# Patient Record
Sex: MALE
[De-identification: names, ages, dates, MRNs, and addresses within clinical notes are randomized per-mention and may not be internally consistent; named-entity substitution may affect disease eponyms.]

---

## 2024-06-10 ENCOUNTER — NON-APPOINTMENT (OUTPATIENT)
Age: 29
End: 2024-06-10

## 2024-06-10 ENCOUNTER — APPOINTMENT (OUTPATIENT)
Dept: OTOLARYNGOLOGY | Facility: CLINIC | Age: 29
End: 2024-06-10
Payer: COMMERCIAL

## 2024-06-10 VITALS
DIASTOLIC BLOOD PRESSURE: 85 MMHG | WEIGHT: 275 LBS | BODY MASS INDEX: 39.37 KG/M2 | TEMPERATURE: 98.2 F | HEIGHT: 70 IN | OXYGEN SATURATION: 99 % | SYSTOLIC BLOOD PRESSURE: 150 MMHG | HEART RATE: 92 BPM

## 2024-06-10 DIAGNOSIS — Z78.9 OTHER SPECIFIED HEALTH STATUS: ICD-10-CM

## 2024-06-10 DIAGNOSIS — H61.23 IMPACTED CERUMEN, BILATERAL: ICD-10-CM

## 2024-06-10 DIAGNOSIS — Z87.891 PERSONAL HISTORY OF NICOTINE DEPENDENCE: ICD-10-CM

## 2024-06-10 DIAGNOSIS — Z82.49 FAMILY HISTORY OF ISCHEMIC HEART DISEASE AND OTHER DISEASES OF THE CIRCULATORY SYSTEM: ICD-10-CM

## 2024-06-10 PROBLEM — Z00.00 ENCOUNTER FOR PREVENTIVE HEALTH EXAMINATION: Status: ACTIVE | Noted: 2024-06-10

## 2024-06-10 PROCEDURE — 31231 NASAL ENDOSCOPY DX: CPT

## 2024-06-10 PROCEDURE — 99204 OFFICE O/P NEW MOD 45 MIN: CPT | Mod: 25

## 2024-06-10 RX ORDER — FAMOTIDINE 20 MG/1
20 TABLET, FILM COATED ORAL
Qty: 180 | Refills: 1 | Status: ACTIVE | COMMUNITY
Start: 2024-06-10 | End: 1900-01-01

## 2024-06-10 RX ORDER — FLUTICASONE PROPIONATE 50 UG/1
50 SPRAY, METERED NASAL
Qty: 3 | Refills: 1 | Status: ACTIVE | COMMUNITY
Start: 2024-06-10 | End: 1900-01-01

## 2024-06-10 RX ORDER — AMOXICILLIN AND CLAVULANATE POTASSIUM 875; 125 MG/1; MG/1
875-125 TABLET, COATED ORAL
Qty: 14 | Refills: 0 | Status: ACTIVE | COMMUNITY
Start: 2024-06-10 | End: 1900-01-01

## 2024-06-10 NOTE — PHYSICAL EXAM
[de-identified] : thick [Binocular Microscopic Exam] : Binocular microscopic exam was performed [FreeTextEntry8] : Obstructing cerumen was removed with a hook [FreeTextEntry9] : Obstructing cerumen was removed with a hook [Nasal Endoscopy Performed] : nasal endoscopy was performed, see procedure section for findings [de-identified] :  Large inferior turbinates; positive Afrin test [Laryngoscopy Performed] : laryngoscopy was performed, see procedure section for findings [de-identified] : mFTP2a [de-identified] : 3+ [Normal] : no masses and lesions seen, face is symmetric

## 2024-06-10 NOTE — HISTORY OF PRESENT ILLNESS
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 Subsequent hearing loss generally passes with extended decongestant use. This occurred most recently 2 wks ago. At baseline he doesn't notice hearing loss or tinnitus. No hx tubes or an audio Stuffy nose for which he uses nasal decongestant spray prn. No hx obvious allergies but he has perpetual discolored rhinorrhea and some midfacial headaches. He's never tried a nasal steroid.  Periodic reflux that he doesn't treat Snoring, witnessed apneas, lots of daytime somnolence and worsening HTN. Gaining weight.

## 2024-06-10 NOTE — ASSESSMENT
[FreeTextEntry1] : We discussed a BMT vs balloon ET dila'n; he'd like to be able to SCUBA so we will seek approval of the latter.   First, however, start a nasal steroid, sleep study, abx, CT sinus and RTC

## 2024-06-10 NOTE — PROCEDURE
[FreeTextEntry6] : Indication: requirement for exam not possible via anterior rhinoscopy; chronic nasal obstruction After verbal consent and the administration of an aerosolized oxymetazoline/lidocaine mix, examination was performed with a flexible endoscope attached to a video monitoring system. Findings: Septum: undulating Mucosa: normal Polyposis: not present Inferior turbinates: very large, pos Afrin test Middle and superior turbinates: normal Inferior meatus: unremarkable Middle meatus: unremarkable Superior meatus: unremarkable Speno-ethmoidal recess: unremarkable Nasopharynx: unremarkable Secretions: purulent posteriorly on both sides Other findings: none [de-identified] : Indication: requirement for exam not possible via anterior examination; ARA After verbal consent and the administration of an aerosolized oxymetazoline/lidocaine mix, examination was performed with a flexible endoscope placed through the nose which was attached to a video monitoring system. Findings: Nasopharynx: unremarkable Soft palate, lateral and posterior pharyngeal walls: unremarkable Base of tongue & lingual tonsil: minimal retrodisplacement Vallecula: unremarkable Epiglottis: omega Piriform sinuses and pharyngoesophageal junction: unremarkable Arytenoids and AE folds: moderate interarytenoid edema Ventricle and false vocal folds: unremarkable True vocal folds: Smooth free edge; surface without ectasias or edema; normal movement bilaterally with good apposition in phonation Visible subglottis: unremarkable Narrow-band imaging: not used Other findings: ELM

## 2024-06-18 ENCOUNTER — APPOINTMENT (OUTPATIENT)
Dept: OTOLARYNGOLOGY | Facility: CLINIC | Age: 29
End: 2024-06-18
Payer: COMMERCIAL

## 2024-06-18 VITALS
DIASTOLIC BLOOD PRESSURE: 78 MMHG | BODY MASS INDEX: 39.37 KG/M2 | WEIGHT: 275 LBS | HEART RATE: 117 BPM | TEMPERATURE: 97.7 F | OXYGEN SATURATION: 98 % | HEIGHT: 70 IN | SYSTOLIC BLOOD PRESSURE: 160 MMHG

## 2024-06-18 DIAGNOSIS — K21.9 GASTRO-ESOPHAGEAL REFLUX DISEASE W/OUT ESOPHAGITIS: ICD-10-CM

## 2024-06-18 DIAGNOSIS — G47.33 OBSTRUCTIVE SLEEP APNEA (ADULT) (PEDIATRIC): ICD-10-CM

## 2024-06-18 DIAGNOSIS — J32.9 CHRONIC SINUSITIS, UNSPECIFIED: ICD-10-CM

## 2024-06-18 DIAGNOSIS — J34.3 HYPERTROPHY OF NASAL TURBINATES: ICD-10-CM

## 2024-06-18 DIAGNOSIS — H69.93 UNSPECIFIED EUSTACHIAN TUBE DISORDER, BILATERAL: ICD-10-CM

## 2024-06-18 DIAGNOSIS — H90.0 CONDUCTIVE HEARING LOSS, BILATERAL: ICD-10-CM

## 2024-06-18 PROCEDURE — 92557 COMPREHENSIVE HEARING TEST: CPT

## 2024-06-18 PROCEDURE — G2211 COMPLEX E/M VISIT ADD ON: CPT | Mod: NC,1L

## 2024-06-18 PROCEDURE — 92567 TYMPANOMETRY: CPT

## 2024-06-18 PROCEDURE — 99214 OFFICE O/P EST MOD 30 MIN: CPT

## 2024-06-18 NOTE — ASSESSMENT
[FreeTextEntry1] : We again discussed a likely balloon ET dila'n; RTC after HSAT and CT sinus for a rpt scope

## 2024-06-18 NOTE — HISTORY OF PRESENT ILLNESS
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 Subsequent hearing loss generally passes with extended decongestant use. Now s/p . At baseline he doesn't notice hearing loss or tinnitus. No hx tubes or an audio Stuffy nose for which he uses nasal decongestant spray prn. No hx obvious allergies but he has perpetual discolored rhinorrhea and some midfacial headaches. He's never tried a nasal steroid. CT pending Periodic reflux that he doesn't treat Snoring, witnessed apneas, lots of daytime somnolence and worsening HTN. Gaining weight.

## 2024-06-18 NOTE — PHYSICAL EXAM
[de-identified] : thick [de-identified] :  Large inferior turbinates; positive Afrin test [de-identified] : mFTP2a [de-identified] : 3+ [Normal] : the left external ear was normal

## 2024-06-25 ENCOUNTER — TRANSCRIPTION ENCOUNTER (OUTPATIENT)
Age: 29
End: 2024-06-25

## 2024-06-25 ENCOUNTER — APPOINTMENT (OUTPATIENT)
Dept: SLEEP CENTER | Facility: HOME HEALTH | Age: 29
End: 2024-06-25

## 2024-06-25 ENCOUNTER — OUTPATIENT (OUTPATIENT)
Dept: OUTPATIENT SERVICES | Facility: HOSPITAL | Age: 29
LOS: 1 days | End: 2024-06-25
Payer: COMMERCIAL

## 2024-06-25 ENCOUNTER — APPOINTMENT (OUTPATIENT)
Dept: CT IMAGING | Facility: CLINIC | Age: 29
End: 2024-06-25

## 2024-06-25 ENCOUNTER — OUTPATIENT (OUTPATIENT)
Dept: OUTPATIENT SERVICES | Facility: HOSPITAL | Age: 29
LOS: 1 days | End: 2024-06-25

## 2024-06-25 PROCEDURE — 70486 CT MAXILLOFACIAL W/O DYE: CPT | Mod: 26

## 2024-06-25 PROCEDURE — 95800 SLP STDY UNATTENDED: CPT

## 2024-06-25 PROCEDURE — 95800 SLP STDY UNATTENDED: CPT | Mod: 26

## 2024-07-03 DIAGNOSIS — G47.33 OBSTRUCTIVE SLEEP APNEA (ADULT) (PEDIATRIC): ICD-10-CM

## 2024-07-23 ENCOUNTER — APPOINTMENT (OUTPATIENT)
Dept: OTOLARYNGOLOGY | Facility: CLINIC | Age: 29
End: 2024-07-23
Payer: COMMERCIAL

## 2024-07-23 VITALS
WEIGHT: 275 LBS | BODY MASS INDEX: 39.37 KG/M2 | HEIGHT: 70 IN | DIASTOLIC BLOOD PRESSURE: 95 MMHG | HEART RATE: 95 BPM | SYSTOLIC BLOOD PRESSURE: 164 MMHG | TEMPERATURE: 97.9 F | OXYGEN SATURATION: 98 %

## 2024-07-23 DIAGNOSIS — K21.9 GASTRO-ESOPHAGEAL REFLUX DISEASE W/OUT ESOPHAGITIS: ICD-10-CM

## 2024-07-23 DIAGNOSIS — J34.3 HYPERTROPHY OF NASAL TURBINATES: ICD-10-CM

## 2024-07-23 DIAGNOSIS — H69.93 UNSPECIFIED EUSTACHIAN TUBE DISORDER, BILATERAL: ICD-10-CM

## 2024-07-23 DIAGNOSIS — J32.9 CHRONIC SINUSITIS, UNSPECIFIED: ICD-10-CM

## 2024-07-23 DIAGNOSIS — H90.0 CONDUCTIVE HEARING LOSS, BILATERAL: ICD-10-CM

## 2024-07-23 PROCEDURE — 31231 NASAL ENDOSCOPY DX: CPT

## 2024-07-23 PROCEDURE — 99214 OFFICE O/P EST MOD 30 MIN: CPT | Mod: 25

## 2024-07-23 RX ORDER — SULFAMETHOXAZOLE AND TRIMETHOPRIM 800; 160 MG/1; MG/1
800-160 TABLET ORAL TWICE DAILY
Qty: 14 | Refills: 0 | Status: ACTIVE | COMMUNITY
Start: 2024-07-23 | End: 1900-01-01

## 2024-07-24 ENCOUNTER — APPOINTMENT (OUTPATIENT)
Dept: PULMONOLOGY | Facility: CLINIC | Age: 29
End: 2024-07-24
Payer: COMMERCIAL

## 2024-07-24 ENCOUNTER — APPOINTMENT (OUTPATIENT)
Dept: OTOLARYNGOLOGY | Facility: CLINIC | Age: 29
End: 2024-07-24
Payer: COMMERCIAL

## 2024-07-24 VITALS
HEIGHT: 70 IN | TEMPERATURE: 98.1 F | HEART RATE: 116 BPM | OXYGEN SATURATION: 99 % | SYSTOLIC BLOOD PRESSURE: 153 MMHG | BODY MASS INDEX: 39.37 KG/M2 | WEIGHT: 275 LBS | DIASTOLIC BLOOD PRESSURE: 103 MMHG

## 2024-07-24 VITALS
HEART RATE: 114 BPM | WEIGHT: 275 LBS | BODY MASS INDEX: 39.37 KG/M2 | OXYGEN SATURATION: 99 % | SYSTOLIC BLOOD PRESSURE: 148 MMHG | HEIGHT: 70 IN | TEMPERATURE: 98.1 F | DIASTOLIC BLOOD PRESSURE: 98 MMHG

## 2024-07-24 DIAGNOSIS — J32.1 CHRONIC FRONTAL SINUSITIS: ICD-10-CM

## 2024-07-24 DIAGNOSIS — J34.2 DEVIATED NASAL SEPTUM: ICD-10-CM

## 2024-07-24 DIAGNOSIS — J32.0 CHRONIC MAXILLARY SINUSITIS: ICD-10-CM

## 2024-07-24 DIAGNOSIS — D49.1 NEOPLASM OF UNSPECIFIED BEHAVIOR OF RESPIRATORY SYSTEM: ICD-10-CM

## 2024-07-24 DIAGNOSIS — G47.33 OBSTRUCTIVE SLEEP APNEA (ADULT) (PEDIATRIC): ICD-10-CM

## 2024-07-24 DIAGNOSIS — J32.3 CHRONIC SPHENOIDAL SINUSITIS: ICD-10-CM

## 2024-07-24 DIAGNOSIS — E66.9 OBESITY, UNSPECIFIED: ICD-10-CM

## 2024-07-24 DIAGNOSIS — J32.2 CHRONIC ETHMOIDAL SINUSITIS: ICD-10-CM

## 2024-07-24 PROBLEM — D16.9 OSTEOMA: Status: ACTIVE | Noted: 2024-07-24

## 2024-07-24 PROCEDURE — 31231 NASAL ENDOSCOPY DX: CPT

## 2024-07-24 PROCEDURE — G2211 COMPLEX E/M VISIT ADD ON: CPT | Mod: NC,1L

## 2024-07-24 PROCEDURE — 99214 OFFICE O/P EST MOD 30 MIN: CPT | Mod: 25

## 2024-07-24 PROCEDURE — 99204 OFFICE O/P NEW MOD 45 MIN: CPT

## 2024-07-24 NOTE — CONSULT LETTER
[Dear  ___] : Dear  [unfilled], [Consult Letter:] : I had the pleasure of evaluating your patient, [unfilled]. [Please see my note below.] : Please see my note below. [Consult Closing:] : Thank you very much for allowing me to participate in the care of this patient.  If you have any questions, please do not hesitate to contact me. [Sincerely,] : Sincerely, [FreeTextEntry2] : Yo Briseno [FreeTextEntry3] : Pedro Harris DO

## 2024-07-24 NOTE — ASSESSMENT
[FreeTextEntry1] : We reviewed his CT at length and there does appear to be drainage issues with the superior aspect of his frontal sinus; accordingly I asked him to see Dr. Dale to consider an osteoplastic flap or other intervention. He'd still like to proceed with the eustachian tube balloon which can be done at the same time as sinus surgery if he goes for that.

## 2024-07-24 NOTE — HISTORY OF PRESENT ILLNESS
[Never] : never [Obstructive Sleep Apnea] : obstructive sleep apnea [TextBox_4] : 29 year old presenting for evaluation of ARA. Patient states that he is a never smoker and over the last few years he has noticed that he is very tired all the time. He has no family hx of ARA that he knows of. He can easily fall asleep after work and feels that he has no energy. He does snore and his wife has noted that at times he seems like he stops breathing in his sleep. He does not get up frequently in his sleep to urinate. He denies difficulty focusing or falling asleep during the daytime. [ESS] : 13

## 2024-07-24 NOTE — ASSESSMENT
[FreeTextEntry1] : 29 year old presenting for evaluation of ARA  Data reviewed: Sleep study 6/2024- Severe ARA with afib noted. AHI 57.7 ENT notes reviewed  Severe ARA Atrial fibrillation Obesity  Sleep study reviewed with patient and found to have severe ARA. Explained that moderate and severe ARA medically need to be treated due to increased risk of high BP, heart disease, dementia, and stroke with untreated sleep apnea. Options for treatment were discussed and will plan to start PAP therapy. Discussed what to expect with this and need to use 70% of the night or more. Will return to clinic in 8 weeks after getting CPAP machine.  Patient planning for sinus surgery in about 2 weeks. Given severity of ARA would plan to performed surgery with patient intubated and would limit intraoperative opioids, extubate patient when awake, extubate to NIV to allow for proper ventilation.  - Severe ARA will start PAP therapy - Would recommend that patient be intubated for procedure with severe ARA. ARA precautions during and after procedure with limiting of intraoperative opioids, extubate when awake fully, extubate to NIV, prolonged monitoring during and after procedure. - Given Afib noted on sleep study referral made to cardiology   RTC in 8 weeks

## 2024-07-24 NOTE — PHYSICAL EXAM
[de-identified] : thick [de-identified] :  Large inferior turbinates; positive Afrin test [de-identified] : mFTP2a [de-identified] : 3+ [Normal] : the left external ear was normal

## 2024-07-24 NOTE — DATA REVIEWED
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

## 2024-07-24 NOTE — PROCEDURE
[FreeTextEntry6] : Indication: requirement for exam not possible via anterior rhinoscopy; chronic sinusitis After verbal consent and the administration of an aerosolized oxymetazoline/lidocaine mix, examination was performed with a flexible endoscope attached to a video monitoring system. Findings: Septum: undulating Mucosa: normal Polyposis: not present Inferior turbinates: very large, pos Afrin test Middle and superior turbinates: normal Inferior meatus: unremarkable Middle meatus: unremarkable Superior meatus: unremarkable Speno-ethmoidal recess: unremarkable Nasopharynx: unremarkable Secretions: again purulent posteriorly on both sides Other findings: none

## 2024-07-24 NOTE — HISTORY OF PRESENT ILLNESS
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 Subsequent hearing loss generally passes with extended decongestant use. Now s/p . At baseline he doesn't notice hearing loss or tinnitus. No hx tubes or an audio. He'd like to proceed w/ balloon ET dilation when possible.  Stuffy nose for which he uses nasal decongestant spray prn. No hx obvious allergies but he has perpetual discolored rhinorrhea and some midfacial headaches. He's never tried a nasal steroid. CT now completed Periodic reflux  Snoring, witnessed apneas, lots of daytime somnolence and worsening HTN. Gaining weight. Now s/p HSAT showing severe ARA

## 2024-07-25 ENCOUNTER — APPOINTMENT (OUTPATIENT)
Dept: NEUROSURGERY | Facility: CLINIC | Age: 29
End: 2024-07-25
Payer: COMMERCIAL

## 2024-07-25 DIAGNOSIS — D16.9 BENIGN NEOPLASM OF BONE AND ARTICULAR CARTILAGE, UNSPECIFIED: ICD-10-CM

## 2024-07-25 PROBLEM — J32.2 CHRONIC ETHMOIDAL SINUSITIS: Status: ACTIVE | Noted: 2024-07-25

## 2024-07-25 PROBLEM — J32.3 CHRONIC SPHENOIDAL SINUSITIS: Status: ACTIVE | Noted: 2024-07-25

## 2024-07-25 PROBLEM — D49.1: Status: ACTIVE | Noted: 2024-07-25

## 2024-07-25 PROBLEM — J34.2 DNS (DEVIATED NASAL SEPTUM): Status: ACTIVE | Noted: 2024-07-25

## 2024-07-25 PROBLEM — J32.1 CHRONIC FRONTAL SINUSITIS: Status: ACTIVE | Noted: 2024-07-25

## 2024-07-25 PROBLEM — J32.0 CHRONIC MAXILLARY SINUSITIS: Status: ACTIVE | Noted: 2024-07-25

## 2024-07-25 PROBLEM — E66.9 OBESITY (BMI 30.0-34.9): Status: ACTIVE | Noted: 2024-07-24

## 2024-07-25 PROCEDURE — 99204 OFFICE O/P NEW MOD 45 MIN: CPT

## 2024-07-25 NOTE — DATA REVIEWED
[de-identified] :   	 EXAM: [id number] - CT SINUSES  - ORDERED BY: JR NIRAV HEMPHILL   PROCEDURE DATE:  06/25/2024    INTERPRETATION:  CLINICAL INFORMATION: Chronic sinusitis, coughing, airway blockage  Technique: Navigational/1 mm CT imaging is obtained through the paranasal sinuses. Multiplanar reformations provided in bone and soft tissue algorithm. Contrast: None  Prior Studies: None  Findings:  *  Prior Surgery: No prior endoscopic surgical change is evident.  *  Sinuses: Inflammatory mucosal thickening along with mucoid content and small layering fluid is present in multiple bilateral paranasal sinuses. No evidence for an odontogenic contribution.  There is a 3.4 x 3.2 cm osteoma in the right frontal sinus partially crossing midline, and it does not obstruct outflow.  *  Sinocranial and Sino-orbital Junctions: Intact.  *  Ostiomeatal Units: Both sinus partially opacified with mucosal thickening, currently left asymmetric, narrowed adjacent to low-lying ethmoids.  *  Frontal Sinus Outflow Tracts: Right side patent as seen on sagittal image 48. Left side opacified distally and narrowed facet accessory ethmoid cells.  *  Sphenoethmoidal Recesses: Mild rhinitis change but otherwise patent.  *  Nasal Cavity/Nasopharynx: No masses or bony destruction. Turbinates appear hypertrophic. There is postnasal drip.  *  Orbits: No abnormal soft tissue or mass effect.  *  Brain:  Low dose CT inspection is grossly unremarkable.  *  Mastoids: Partial opacification on left at mastoid process. Right mastoids and both middle ears are clear.   IMPRESSION:  Pansinus inflammatory mucosal thickening and mucoid content. Multi-outflow narrowing, left asymmetric as outlined above.  Large right frontal sinus osteoma but without outflow obstruction.  Nasal turbinate hypertrophy. Postnasal drip.  Navigational scan.  --- End of Report ---

## 2024-07-25 NOTE — ASSESSMENT
[FreeTextEntry1] : - Thomas is a 29-year-old patient of Dr. Dale. He has an osteoma (abnormal bony growth) within his frontal sinus, obstructing the outflow tract.The CT scan clearly shows a round osteoma with a margin, indicating a benign growth. The osteoma is located in a safe area, with minimal risk of brain involvement during surgical removal. patient scheduled for surgery with Dr. Dale on 8/12. Will assist with endoscopic endonasal removal of anterior fossa skull base lesion.  Dr. D'Amico independently reviewed all available images with patient.  PLAN: - Surgical planning per ENT Dr. Dale's office (8/12/24)   Patient verbalizes understanding of today's discussion and next steps in treatment plan.     A total of 45 minutes was spent reviewing the labs, imaging and physical examination of the patient. We discussed the diagnosis, and the plan. The patient's questions were answered. The patient demonstrated an excellent understanding of the plan.

## 2024-07-25 NOTE — PHYSICAL EXAM
[Oriented To Time, Place, And Person] : oriented to person, place, and time [Impaired Insight] : insight and judgment were intact [Affect] : the affect was normal [Memory Recent] : recent memory was not impaired [Sclera] : the sclera and conjunctiva were normal [Neck Appearance] : the appearance of the neck was normal [] : no respiratory distress [Respiration, Rhythm And Depth] : normal respiratory rhythm and effort [Skin Color & Pigmentation] : normal skin color and pigmentation

## 2024-07-25 NOTE — HISTORY OF PRESENT ILLNESS
[de-identified] : CC: frontal sinus mass   HISTORY OF PRESENT ILLNESS: Mr. Sosa is a pleasant 29 year old gentleman with frontal sinus mass referred by Dr. 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 Briseno for UPPP and ETBD   REVIEW OF SYSTEMS: General ROS: negative for - chills, fatigue or fever Psychological ROS: negative for - anxiety or depression Ophthalmic ROS: negative for - blurry vision, decreased vision or double vision ENT ROS: negative except as noted from HPI Allergy and Immunology ROS: negative except as noted from HPI Hematological and Lymphatic ROS: negative for - bleeding problems Endocrine ROS: negative for - malaise/lethargy Respiratory ROS: negative for - stridor Cardiovascular ROS: negative for - chest pain Gastrointestinal ROS: negative for - appetite loss or nausea/vomiting Genitourinary ROS: negative for - incontinence Musculoskeletal ROS: negative for - gait disturbance Neurological ROS: negative for - behavioral changes Dermatological ROS: negative for - nail changes   Physical Exam:   GENERAL APPEARANCE: Well-developed and No Acute Distress. COMMUNICATION: Able to Communicate. Strong Voice.   HEAD AND FACE Eyes: Testing of ocular motility including primary gaze alignment normal. Inspection and Appearance: No evidence of lesions or masses Palpation: Palpation of the face reveals no sinus tenderness Salivary Glands: Symmetric without masses Facial Strength: Symmetric without evidence of facial paralysis   EAR, NOSE, MOUTH, and THROAT: Ear Canals and Tympanic Membranes, Bilateral: No evidence of inflammation or lesions. Thresholds: Clinical speech reception thresholds normal. External, Nose and Auricle: No lesions or masses.   NECK: Evaluation: No evidence of masses or crepitus. The neck is symmetric and the trachea is in the midline position. Thyroid: No evidence of enlargement, tenderness or mass. Neck Lymph Nodes: WNL. Respiratory: Inspection of the chest including symmetry, expansion and/or assessment of respiratory effort normal. Cardiovascular: Evaluation of peripheral vascular system by observation and palpation of capillary refill, normal. Neurological/Psychiatric: Alert, Oriented, Mood, and Affect Normal.   PROCEDURE: Nasal endoscopy (24756)   SURGEON: Matt Dale MD   Prior to the procedure, I had a discussion with the patient regarding the risks, benefits, and alternatives of the procedure and a verbal consent was obtained.   After obtaining adequate decongestion of the nasal mucosa with topical Epinephrine and adequate anesthesia with topical Lidocaine nasal spray, the nasal endoscope was used to examine the nasal passages and paranasal sinuses. The endoscope was passed along the floor of the nose bilaterally to evaluate the inferior meatus, floor of the nose, inferior turbinate, and nasopharynx. The scope was then passed superiorly to evaluate the area of the sphenoethmoidal recess, superior turbinate and superior meatus. As the scope was withdrawn anteriorly, the middle turbinate and middle meatus were carefully inspected. The endoscope was withdrawn and the patient tolerated the procedure well. No complications were encountered.   INSTRUMENTS: rigid 45   EXAM FINDINGS: BITH. no polyps or mucopurulence  IMPRESSION: Mr. Sosa is a pleasant 29 year old gentleman with frontal sinus osteoma, CRSsNP, ETD, severe ARA, BMI39   PLAN: -we discussed medical weight loss, for which he has the number for Dr. Michelle Retana -we will stage the procedures for which he has elected to proceed with approach resection and reconstruction as necessary for the frontal sinus osteoma, he understands the risks for total/subtotal endoscopic resection including but not limited to bleeding, CSF leak, and persistent sinusitis and recurrence of lesion. he wishes to proceed. -after he has recovered from his procedure, he will schedule for his UPPP, ETBD with Dr. Briseno -he will also initiate CPAP therapy -overnight stay due to ARA -OR 8/12     Matt Dale MD [facility] Department of Otolaryngology- Head and Neck Surgery [facility]

## 2024-07-25 NOTE — HISTORY OF PRESENT ILLNESS
[de-identified] : 28 y/o male, never smoker, with PMHx of severe ARA who presents as referral from ENT Matt Dale for pre- op consultation prior to planned endonasal approach, resection, and reconstruction of skull base 3.4 cm frontal sinus osteoma that was dx during workup for ear pain and perpetual discolored rhinorrhea and some midfacial headaches.   Presents today for pre- op consultation.   ENT: Matt Dale

## 2024-07-25 NOTE — REASON FOR VISIT
[Home] : at home, [unfilled] , at the time of the visit. [Medical Office: ([facility])___] : at the medical office located in  [Patient] : the patient [Referred By: _________] : Patient was referred by [facility]

## 2024-07-26 ENCOUNTER — APPOINTMENT (OUTPATIENT)
Dept: INTERNAL MEDICINE | Facility: CLINIC | Age: 29
End: 2024-07-26

## 2024-07-26 ENCOUNTER — APPOINTMENT (OUTPATIENT)
Dept: HEART AND VASCULAR | Facility: CLINIC | Age: 29
End: 2024-07-26
Payer: COMMERCIAL

## 2024-07-26 ENCOUNTER — NON-APPOINTMENT (OUTPATIENT)
Age: 29
End: 2024-07-26

## 2024-07-26 VITALS
OXYGEN SATURATION: 96 % | SYSTOLIC BLOOD PRESSURE: 157 MMHG | BODY MASS INDEX: 41.14 KG/M2 | HEIGHT: 70 IN | WEIGHT: 287.38 LBS | TEMPERATURE: 98.1 F | HEART RATE: 106 BPM | DIASTOLIC BLOOD PRESSURE: 92 MMHG

## 2024-07-26 DIAGNOSIS — I48.0 PAROXYSMAL ATRIAL FIBRILLATION: ICD-10-CM

## 2024-07-26 DIAGNOSIS — Z01.810 ENCOUNTER FOR PREPROCEDURAL CARDIOVASCULAR EXAMINATION: ICD-10-CM

## 2024-07-26 DIAGNOSIS — R94.31 ABNORMAL ELECTROCARDIOGRAM [ECG] [EKG]: ICD-10-CM

## 2024-07-26 DIAGNOSIS — I15.9 SECONDARY HYPERTENSION, UNSPECIFIED: ICD-10-CM

## 2024-07-26 PROCEDURE — 93000 ELECTROCARDIOGRAM COMPLETE: CPT

## 2024-07-26 PROCEDURE — 99204 OFFICE O/P NEW MOD 45 MIN: CPT | Mod: 25

## 2024-07-26 NOTE — REASON FOR VISIT
[Symptom and Test Evaluation] : symptom and test evaluation [FreeTextEntry1] :   CV Data: ECG 7/26/24: TWI V5, V6, sinus tach 102 bpm

## 2024-07-26 NOTE — HISTORY OF PRESENT ILLNESS
[FreeTextEntry1] : 29M w ENT disorders, severe ARA refer for preop cardiac evaluation for frontal sinus osteoma removal on 8/19/24  - noted to have afib on sleep study in June - asymptomatic. unconfirmed with ECG or rhythm monitoring - completely asymptomatic - never been under GA. no issues with local anesthesia  FH: parents alive and healthy, bro - heathy SH: never smoker, occasional etoh, no drugs moved for work to [place] [employer] from [place], [place]

## 2024-07-29 ENCOUNTER — APPOINTMENT (OUTPATIENT)
Dept: HEART AND VASCULAR | Facility: CLINIC | Age: 29
End: 2024-07-29
Payer: COMMERCIAL

## 2024-07-29 PROCEDURE — 93306 TTE W/DOPPLER COMPLETE: CPT

## 2024-07-31 ENCOUNTER — APPOINTMENT (OUTPATIENT)
Dept: INTERNAL MEDICINE | Facility: CLINIC | Age: 29
End: 2024-07-31
Payer: COMMERCIAL

## 2024-07-31 VITALS
OXYGEN SATURATION: 96 % | HEART RATE: 90 BPM | DIASTOLIC BLOOD PRESSURE: 94 MMHG | TEMPERATURE: 97.9 F | SYSTOLIC BLOOD PRESSURE: 155 MMHG | BODY MASS INDEX: 40.37 KG/M2 | WEIGHT: 282 LBS | HEIGHT: 70 IN | RESPIRATION RATE: 16 BRPM

## 2024-07-31 DIAGNOSIS — R60.9 EDEMA, UNSPECIFIED: ICD-10-CM

## 2024-07-31 DIAGNOSIS — Z01.818 ENCOUNTER FOR OTHER PREPROCEDURAL EXAMINATION: ICD-10-CM

## 2024-07-31 PROCEDURE — 99204 OFFICE O/P NEW MOD 45 MIN: CPT | Mod: 25

## 2024-07-31 PROCEDURE — 36415 COLL VENOUS BLD VENIPUNCTURE: CPT

## 2024-07-31 NOTE — PHYSICAL EXAM
[No Acute Distress] : no acute distress [Normal Sclera/Conjunctiva] : normal sclera/conjunctiva [PERRL] : pupils equal round and reactive to light [EOMI] : extraocular movements intact [No JVD] : no jugular venous distention [No Lymphadenopathy] : no lymphadenopathy [Supple] : supple [Thyroid Normal, No Nodules] : the thyroid was normal and there were no nodules present [No Respiratory Distress] : no respiratory distress  [No Accessory Muscle Use] : no accessory muscle use [Clear to Auscultation] : lungs were clear to auscultation bilaterally [Normal Rate] : normal rate  [Regular Rhythm] : with a regular rhythm [Normal S1, S2] : normal S1 and S2 [No Murmur] : no murmur heard [No Carotid Bruits] : no carotid bruits [No Abdominal Bruit] : a ~M bruit was not heard ~T in the abdomen [No Varicosities] : no varicosities [Pedal Pulses Present] : the pedal pulses are present [No Edema] : there was no peripheral edema [No Palpable Aorta] : no palpable aorta [No Extremity Clubbing/Cyanosis] : no extremity clubbing/cyanosis [Soft] : abdomen soft [Non Tender] : non-tender [Non-distended] : non-distended [No Masses] : no abdominal mass palpated [No HSM] : no HSM [Normal Bowel Sounds] : normal bowel sounds [Normal Posterior Cervical Nodes] : no posterior cervical lymphadenopathy [Normal Anterior Cervical Nodes] : no anterior cervical lymphadenopathy [No CVA Tenderness] : no CVA  tenderness [No Spinal Tenderness] : no spinal tenderness [No Joint Swelling] : no joint swelling [Grossly Normal Strength/Tone] : grossly normal strength/tone [No Rash] : no rash [Coordination Grossly Intact] : coordination grossly intact [No Focal Deficits] : no focal deficits [Normal Gait] : normal gait [Deep Tendon Reflexes (DTR)] : deep tendon reflexes were 2+ and symmetric [Normal Affect] : the affect was normal [Normal Insight/Judgement] : insight and judgment were intact

## 2024-08-02 DIAGNOSIS — E55.9 VITAMIN D DEFICIENCY, UNSPECIFIED: ICD-10-CM

## 2024-08-02 DIAGNOSIS — E11.9 TYPE 2 DIABETES MELLITUS W/OUT COMPLICATIONS: ICD-10-CM

## 2024-08-02 LAB
25(OH)D3 SERPL-MCNC: 16.6 NG/ML
ALBUMIN SERPL ELPH-MCNC: 4.8 G/DL
ALP BLD-CCNC: 97 U/L
ALT SERPL-CCNC: 36 U/L
ANION GAP SERPL CALC-SCNC: 12 MMOL/L
AST SERPL-CCNC: 34 U/L
BILIRUB SERPL-MCNC: 0.2 MG/DL
BUN SERPL-MCNC: 15 MG/DL
CALCIUM SERPL-MCNC: 10.1 MG/DL
CHLORIDE SERPL-SCNC: 101 MMOL/L
CHOLEST SERPL-MCNC: 182 MG/DL
CO2 SERPL-SCNC: 25 MMOL/L
CREAT SERPL-MCNC: 1.3 MG/DL
EGFR: 77 ML/MIN/1.73M2
ESTIMATED AVERAGE GLUCOSE: 140 MG/DL
GLUCOSE SERPL-MCNC: 94 MG/DL
HBA1C MFR BLD HPLC: 6.5 %
HCT VFR BLD CALC: 43.6 %
HDLC SERPL-MCNC: 58 MG/DL
HGB BLD-MCNC: 14.1 G/DL
LDLC SERPL CALC-MCNC: 114 MG/DL
MCHC RBC-ENTMCNC: 28.1 PG
MCHC RBC-ENTMCNC: 32.3 GM/DL
MCV RBC AUTO: 86.9 FL
NONHDLC SERPL-MCNC: 124 MG/DL
PLATELET # BLD AUTO: 261 K/UL
POTASSIUM SERPL-SCNC: 4.2 MMOL/L
PROT SERPL-MCNC: 7.9 G/DL
RBC # BLD: 5.02 M/UL
RBC # FLD: 14.9 %
SODIUM SERPL-SCNC: 138 MMOL/L
TRIGL SERPL-MCNC: 53 MG/DL
TSH SERPL-ACNC: 2.38 UIU/ML
WBC # FLD AUTO: 5.79 K/UL

## 2024-08-02 RX ORDER — ERGOCALCIFEROL (VITAMIN D2) 50 MCG
50 MCG CAPSULE ORAL
Qty: 90 | Refills: 0 | Status: ACTIVE | COMMUNITY
Start: 2024-08-02 | End: 1900-01-01

## 2024-08-02 NOTE — HISTORY OF PRESENT ILLNESS
[Atrial Fibrillation] : atrial fibrillation [Sleep Apnea] : sleep apnea [(Patient denies any chest pain, claudication, dyspnea on exertion, orthopnea, palpitations or syncope)] : Patient denies any chest pain, claudication, dyspnea on exertion, orthopnea, palpitations or syncope [Moderate (4-6 METs)] : Moderate (4-6 METs) [FreeTextEntry1] : total/subtotal endoscopic resection of osteoma.  [FreeTextEntry2] : 08/19/2024 [FreeTextEntry4] : 29 yrs old M with pmx of afib, ARA on CPAP, obesity, GERD, chronic sinusitis, frontal sinus osteoma comes in for pre-op eval for total/subtotal endoscopic resection of osteoma.  No new complains.  Denies any chest pain, cough, fevers, abd pain, vomiting, diarrhea, rash, joint pains, sob, palpitations. Moderate functional capacity: can walk more than 10 blocks without any problems. No history of anesthesia use in the past. Denies any blood thinners but use herbal meds.

## 2024-08-02 NOTE — ASSESSMENT
[Patient Optimized for Surgery] : Patient optimized for surgery [No Further Testing Recommended] : no further testing recommended [FreeTextEntry4] : 29 yrs old M with pmx of afib, ARA on CPAP, obesity, GERD, chronic sinusitis, frontal sinus osteoma comes in for pre-op eval for total/subtotal endoscopic resection of osteoma.  RCRI 0 points class I risk. Moderate functional status Pt is low risk for intermediate risk procedure. Asked to stop herbal meds 5 days before procedure.  As per pulmonary: - Would recommend that patient be intubated for procedure with severe ARA. ARA precautions during and after procedure with limiting of intraoperative opioids, extubate when awake fully, extubate to NIV, prolonged monitoring during and after procedure. Cleared by cardiology stand point as well. No other interventions needed from medicine at this point of time.

## 2024-08-07 ENCOUNTER — APPOINTMENT (OUTPATIENT)
Dept: INTERNAL MEDICINE | Facility: CLINIC | Age: 29
End: 2024-08-07

## 2024-08-12 DIAGNOSIS — J32.3 CHRONIC SPHENOIDAL SINUSITIS: ICD-10-CM

## 2024-08-12 DIAGNOSIS — J32.2 CHRONIC ETHMOIDAL SINUSITIS: ICD-10-CM

## 2024-08-12 DIAGNOSIS — J32.0 CHRONIC MAXILLARY SINUSITIS: ICD-10-CM

## 2024-08-12 DIAGNOSIS — J32.1 CHRONIC FRONTAL SINUSITIS: ICD-10-CM

## 2024-08-12 RX ORDER — OXYCODONE AND ACETAMINOPHEN 5; 325 MG/1; MG/1
5-325 TABLET ORAL
Qty: 30 | Refills: 0 | Status: ACTIVE | COMMUNITY
Start: 2024-08-12 | End: 1900-01-01

## 2024-08-12 RX ORDER — ELASTIC BANDAGE 1"X2.2YD
2300-700 BANDAGE TOPICAL
Qty: 1 | Refills: 0 | Status: ACTIVE | COMMUNITY
Start: 2024-08-12 | End: 1900-01-01

## 2024-08-12 RX ORDER — SULFAMETHOXAZOLE AND TRIMETHOPRIM 800; 160 MG/1; MG/1
800-160 TABLET ORAL TWICE DAILY
Qty: 28 | Refills: 0 | Status: ACTIVE | COMMUNITY
Start: 2024-08-12 | End: 1900-01-01

## 2024-08-12 RX ORDER — PREDNISONE 10 MG/1
10 TABLET ORAL
Qty: 60 | Refills: 0 | Status: ACTIVE | COMMUNITY
Start: 2024-08-12 | End: 1900-01-01

## 2024-08-12 RX ORDER — CLINDAMYCIN HYDROCHLORIDE 150 MG/1
150 CAPSULE ORAL
Qty: 42 | Refills: 0 | Status: ACTIVE | COMMUNITY
Start: 2024-08-12 | End: 1900-01-01

## 2024-08-13 ENCOUNTER — APPOINTMENT (OUTPATIENT)
Dept: FAMILY MEDICINE | Facility: CLINIC | Age: 29
End: 2024-08-13
Payer: COMMERCIAL

## 2024-08-13 VITALS — DIASTOLIC BLOOD PRESSURE: 86 MMHG | SYSTOLIC BLOOD PRESSURE: 152 MMHG

## 2024-08-13 VITALS
HEART RATE: 102 BPM | TEMPERATURE: 97.2 F | BODY MASS INDEX: 40.66 KG/M2 | SYSTOLIC BLOOD PRESSURE: 164 MMHG | OXYGEN SATURATION: 96 % | DIASTOLIC BLOOD PRESSURE: 97 MMHG | HEIGHT: 70 IN | WEIGHT: 284 LBS

## 2024-08-13 DIAGNOSIS — Z00.00 ENCOUNTER FOR GENERAL ADULT MEDICAL EXAMINATION W/OUT ABNORMAL FINDINGS: ICD-10-CM

## 2024-08-13 DIAGNOSIS — E55.9 VITAMIN D DEFICIENCY, UNSPECIFIED: ICD-10-CM

## 2024-08-13 DIAGNOSIS — I15.9 SECONDARY HYPERTENSION, UNSPECIFIED: ICD-10-CM

## 2024-08-13 DIAGNOSIS — E11.9 TYPE 2 DIABETES MELLITUS W/OUT COMPLICATIONS: ICD-10-CM

## 2024-08-13 DIAGNOSIS — I48.0 PAROXYSMAL ATRIAL FIBRILLATION: ICD-10-CM

## 2024-08-13 DIAGNOSIS — G47.33 OBSTRUCTIVE SLEEP APNEA (ADULT) (PEDIATRIC): ICD-10-CM

## 2024-08-13 PROCEDURE — 99385 PREV VISIT NEW AGE 18-39: CPT

## 2024-08-13 NOTE — PLAN
[FreeTextEntry1] : HCM -Reviewed recent bloodwork with patient, patient to follow up in 3 months for repeat testing -F/u specialists as scheduled - Patient verbalizes understanding, agreeable with plan.

## 2024-08-13 NOTE — PHYSICAL EXAM
[No Acute Distress] : no acute distress [Well-Appearing] : well-appearing [No Lymphadenopathy] : no lymphadenopathy [Supple] : supple [Thyroid Normal, No Nodules] : the thyroid was normal and there were no nodules present [Coordination Grossly Intact] : coordination grossly intact [No Focal Deficits] : no focal deficits [Normal Gait] : normal gait [Normal] : affect was normal and insight and judgment were intact

## 2024-08-13 NOTE — HISTORY OF PRESENT ILLNESS
[FreeTextEntry1] : Establish care [de-identified] : 30 yo male presents to establish care. Patient has hx eustachian tube dysfunction, has upcoming total/subtotal endoscopic resection of osteoma on 8/19/24 with ENT Dr. Dale. Recently diagnosed with sleep apnea per pulm Dr. Harris, states ARA equipment has not yet been delivered to his house but plans on starting to use machine once it does. On sleep study, patient noted to go into afib. Patient has since established care with cardiologist Dr. Evans. AC and anti-hypertensive medication were not started due to likely precipitated by uncontrolled ARA. Patient recently had bloodwork done, shown HgbA1c 6.5%. States it is hard to exercise due to constantly feeling fatigued. Plans on starting to exercise and eat healthy once ARA controlled. Patient started on OTC vit D supplement. Otherwise feeling well today.

## 2024-08-13 NOTE — HEALTH RISK ASSESSMENT
[Yes] : Yes [2 - 4 times a month (2 pts)] : 2-4 times a month (2 points) [1 or 2 (0 pts)] : 1 or 2 (0 points) [Never (0 pts)] : Never (0 points) [No] : In the past 12 months have you used drugs other than those required for medical reasons? No [No falls in past year] : Patient reported no falls in the past year [0] : 2) Feeling down, depressed, or hopeless: Not at all (0) [PHQ-2 Negative - No further assessment needed] : PHQ-2 Negative - No further assessment needed [None] : None [With Significant Other] : lives with significant other [Employed] : employed [Fully functional (bathing, dressing, toileting, transferring, walking, feeding)] : Fully functional (bathing, dressing, toileting, transferring, walking, feeding) [Fully functional (using the telephone, shopping, preparing meals, housekeeping, doing laundry, using] : Fully functional and needs no help or supervision to perform IADLs (using the telephone, shopping, preparing meals, housekeeping, doing laundry, using transportation, managing medications and managing finances) [Never] : Never [Audit-CScore] : 2 [de-identified] : sedentary [de-identified] : poor  [OUH2Coley] : 0 [Change in mental status noted] : No change in mental status noted [FreeTextEntry2] : [employer]

## 2024-08-16 RX ORDER — SULFAMETHOXAZOLE AND TRIMETHOPRIM 800; 160 MG/1; MG/1
1 TABLET ORAL
Refills: 0 | DISCHARGE

## 2024-08-16 RX ORDER — FLUTICASONE FUROATE 100 UG/1
1 POWDER RESPIRATORY (INHALATION)
Refills: 0 | DISCHARGE

## 2024-08-16 RX ORDER — FAMOTIDINE 10 MG/ML
1 INJECTION INTRAVENOUS
Refills: 0 | DISCHARGE

## 2024-08-18 ENCOUNTER — TRANSCRIPTION ENCOUNTER (OUTPATIENT)
Age: 29
End: 2024-08-18

## 2024-08-18 RX ORDER — POVIDONE-IODINE 10 %
1 SOLUTION, NON-ORAL TOPICAL ONCE
Refills: 0 | Status: COMPLETED | OUTPATIENT
Start: 2024-08-19 | End: 2024-08-19

## 2024-08-19 ENCOUNTER — APPOINTMENT (OUTPATIENT)
Dept: OTOLARYNGOLOGY | Facility: HOSPITAL | Age: 29
End: 2024-08-19

## 2024-08-19 ENCOUNTER — RESULT REVIEW (OUTPATIENT)
Age: 29
End: 2024-08-19

## 2024-08-19 ENCOUNTER — INPATIENT (INPATIENT)
Facility: HOSPITAL | Age: 29
LOS: 0 days | Discharge: HOME | DRG: 517 | End: 2024-08-20
Attending: GENERAL ACUTE CARE HOSPITAL | Admitting: NEUROLOGICAL SURGERY
Payer: COMMERCIAL

## 2024-08-19 ENCOUNTER — APPOINTMENT (OUTPATIENT)
Dept: NEUROSURGERY | Facility: HOSPITAL | Age: 29
End: 2024-08-19

## 2024-08-19 VITALS
TEMPERATURE: 97 F | DIASTOLIC BLOOD PRESSURE: 96 MMHG | OXYGEN SATURATION: 98 % | HEART RATE: 89 BPM | SYSTOLIC BLOOD PRESSURE: 150 MMHG | RESPIRATION RATE: 16 BRPM | HEIGHT: 61 IN | WEIGHT: 281.97 LBS

## 2024-08-19 LAB
BLD GP AB SCN SERPL QL: NEGATIVE — SIGNIFICANT CHANGE UP
GLUCOSE BLDC GLUCOMTR-MCNC: 133 MG/DL — HIGH (ref 70–99)
RH IG SCN BLD-IMP: POSITIVE — SIGNIFICANT CHANGE UP

## 2024-08-19 PROCEDURE — 61580 CRANIOFACIAL APPROACH SKULL: CPT | Mod: 50

## 2024-08-19 PROCEDURE — 61782 SCAN PROC CRANIAL EXTRA: CPT

## 2024-08-19 PROCEDURE — 64999E: CUSTOM | Mod: 62

## 2024-08-19 PROCEDURE — 88311 DECALCIFY TISSUE: CPT | Mod: 26

## 2024-08-19 PROCEDURE — 88300 SURGICAL PATH GROSS: CPT | Mod: 26,59

## 2024-08-19 PROCEDURE — 88305 TISSUE EXAM BY PATHOLOGIST: CPT | Mod: 26

## 2024-08-19 DEVICE — SURGIFOAM PAD 8CM X 12.5CM X 10MM (100): Type: IMPLANTABLE DEVICE | Status: FUNCTIONAL

## 2024-08-19 DEVICE — SHTXOMED 0.005X13: Type: IMPLANTABLE DEVICE | Status: FUNCTIONAL

## 2024-08-19 DEVICE — SURGIFLO HEMOSTATIC MATRIX KIT: Type: IMPLANTABLE DEVICE | Status: FUNCTIONAL

## 2024-08-19 RX ORDER — CHLORHEXIDINE GLUCONATE 40 MG/ML
1 SOLUTION TOPICAL EVERY 12 HOURS
Refills: 0 | Status: DISCONTINUED | OUTPATIENT
Start: 2024-08-19 | End: 2024-08-19

## 2024-08-19 RX ORDER — HYDROMORPHONE HYDROCHLORIDE 2 MG/1
0.2 TABLET ORAL EVERY 6 HOURS
Refills: 0 | Status: DISCONTINUED | OUTPATIENT
Start: 2024-08-19 | End: 2024-08-20

## 2024-08-19 RX ORDER — APREPITANT 40 MG/1
40 CAPSULE ORAL ONCE
Refills: 0 | Status: COMPLETED | OUTPATIENT
Start: 2024-08-19 | End: 2024-08-19

## 2024-08-19 RX ORDER — EPINEPHRINE HCL 1 MG/ML
1 SOLUTION, NON-ORAL NASAL
Refills: 0 | Status: DISCONTINUED | OUTPATIENT
Start: 2024-08-19 | End: 2024-08-19

## 2024-08-19 RX ORDER — ONDANSETRON 2 MG/ML
4 INJECTION, SOLUTION INTRAMUSCULAR; INTRAVENOUS EVERY 6 HOURS
Refills: 0 | Status: DISCONTINUED | OUTPATIENT
Start: 2024-08-19 | End: 2024-08-20

## 2024-08-19 RX ORDER — OXYCODONE HYDROCHLORIDE 5 MG/1
5 TABLET ORAL EVERY 6 HOURS
Refills: 0 | Status: DISCONTINUED | OUTPATIENT
Start: 2024-08-19 | End: 2024-08-20

## 2024-08-19 RX ORDER — ACETAMINOPHEN 325 MG/1
1000 TABLET ORAL ONCE
Refills: 0 | Status: COMPLETED | OUTPATIENT
Start: 2024-08-19 | End: 2024-08-19

## 2024-08-19 RX ORDER — FLUTICASONE PROPIONATE 50 UG/1
1 SPRAY, METERED NASAL
Refills: 0 | Status: DISCONTINUED | OUTPATIENT
Start: 2024-08-19 | End: 2024-08-20

## 2024-08-19 RX ORDER — LIDOCAINE/BENZALKONIUM/ALCOHOL
1 SOLUTION, NON-ORAL TOPICAL DAILY
Refills: 0 | Status: DISCONTINUED | OUTPATIENT
Start: 2024-08-19 | End: 2024-08-20

## 2024-08-19 RX ORDER — SODIUM CHLORIDE 0.65 %
3 AEROSOL, SPRAY (ML) NASAL EVERY 4 HOURS
Refills: 0 | Status: DISCONTINUED | OUTPATIENT
Start: 2024-08-19 | End: 2024-08-20

## 2024-08-19 RX ORDER — POLYETHYLENE GLYCOL 3350 17 G/17G
17 POWDER, FOR SOLUTION ORAL AT BEDTIME
Refills: 0 | Status: DISCONTINUED | OUTPATIENT
Start: 2024-08-19 | End: 2024-08-20

## 2024-08-19 RX ORDER — ACETAMINOPHEN 325 MG/1
650 TABLET ORAL EVERY 6 HOURS
Refills: 0 | Status: DISCONTINUED | OUTPATIENT
Start: 2024-08-19 | End: 2024-08-19

## 2024-08-19 RX ORDER — ACETAMINOPHEN 325 MG/1
650 TABLET ORAL EVERY 6 HOURS
Refills: 0 | Status: DISCONTINUED | OUTPATIENT
Start: 2024-08-19 | End: 2024-08-20

## 2024-08-19 RX ORDER — SENNA 187 MG
1 TABLET ORAL AT BEDTIME
Refills: 0 | Status: DISCONTINUED | OUTPATIENT
Start: 2024-08-19 | End: 2024-08-20

## 2024-08-19 RX ADMIN — Medication 3 SPRAY(S): at 16:05

## 2024-08-19 RX ADMIN — Medication 100 MILLIGRAM(S): at 18:42

## 2024-08-19 RX ADMIN — OXYCODONE HYDROCHLORIDE 5 MILLIGRAM(S): 5 TABLET ORAL at 14:31

## 2024-08-19 RX ADMIN — HYDROMORPHONE HYDROCHLORIDE 0.2 MILLIGRAM(S): 2 TABLET ORAL at 14:32

## 2024-08-19 RX ADMIN — ACETAMINOPHEN 1000 MILLIGRAM(S): 325 TABLET ORAL at 07:10

## 2024-08-19 RX ADMIN — FLUTICASONE PROPIONATE 1 SPRAY(S): 50 SPRAY, METERED NASAL at 18:43

## 2024-08-19 RX ADMIN — ACETAMINOPHEN 400 MILLIGRAM(S): 325 TABLET ORAL at 15:02

## 2024-08-19 RX ADMIN — APREPITANT 40 MILLIGRAM(S): 40 CAPSULE ORAL at 07:11

## 2024-08-19 RX ADMIN — Medication 1 PATCH: at 15:11

## 2024-08-19 RX ADMIN — CHLORHEXIDINE GLUCONATE 1 APPLICATION(S): 40 SOLUTION TOPICAL at 07:11

## 2024-08-19 RX ADMIN — Medication 3 SPRAY(S): at 18:43

## 2024-08-19 RX ADMIN — ACETAMINOPHEN 1000 MILLIGRAM(S): 325 TABLET ORAL at 15:17

## 2024-08-19 RX ADMIN — Medication 3 SPRAY(S): at 21:20

## 2024-08-19 RX ADMIN — Medication 1 APPLICATION(S): at 07:11

## 2024-08-19 RX ADMIN — HYDROMORPHONE HYDROCHLORIDE 0.2 MILLIGRAM(S): 2 TABLET ORAL at 14:44

## 2024-08-19 RX ADMIN — Medication 75 MILLILITER(S): at 21:20

## 2024-08-19 NOTE — PRE-ANESTHESIA EVALUATION ADULT - NSANTHPMHFT_GEN_ALL_CORE
ARA, to begin on CPAP when cleared after surgery  A.fib episode during sleep study.  Asymptomatic.  Per note, not confirmed on EKG.  No medications

## 2024-08-19 NOTE — PRE-OP CHECKLIST - MUPIRONCIN COMMENTS
3M nasal swab given as ordered 3M nasal swab given as ordered// Not instructed to use chlorhexidine at home

## 2024-08-19 NOTE — H&P ADULT - HISTORY OF PRESENT ILLNESS
A 29-year-old male, non-smoker, with a history of severe obstructive sleep apnea and asymptomatic atrial fibrillation, was diagnosed with a frontal sinus osteoma during an evaluation for ear pain, chronic discolored rhinorrhea, and occasional midfacial headaches. A CT scan on 6/25/24 showed pansinus inflammatory mucosal thickening, mucoid content, and multi-outflow narrowing, particularly on the left side. The 3.4 cm osteoma, located in the right frontal sinus and partially crossing the midline, does not obstruct the outflow. Additional findings include nasal turbinate hypertrophy.    He presents today in the OR for an endoscopic endonasal resection of the frontal sinus lesion and reconstruction of the skull base.    Medications include:    Famotidine 20 mg, taken orally twice daily as needed for reflux.  Fluticasone Propionate 50 mcg nasal spray, two sprays in each nostril every morning.  Sulfamethoxazole-Trimethoprim 800-160 mg, taken orally twice daily until the course is finished.

## 2024-08-19 NOTE — H&P ADULT - ASSESSMENT
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

## 2024-08-19 NOTE — H&P ADULT - NSHPREVIEWOFSYSTEMS_GEN_ALL_CORE
Constitutional:  28 y/o male, awake, alert in no acute distress.  Eyes:  Sclera anicteric, conjunctiva noninjected.  ENMT: Oropharyngeal mucosa moist, pink. Tongue midline.    Neck: Neck supple, FROM.  No appreciable lymphadenopathy.  Back:  No pain to palpation/percussion of low back. No CVA tenderness.  Respiratory: Clear to auscultation bilaterally.  No rales, rhonchi, wheezes.  Cardiovascular: Regular rate and rhythm.  S1, S2 heard.  Gastrointestinal:  Soft, nontender, nondistended.  +BS.  Genitourinary:  Deferred.  Rectal: Deferred.  Vascular: Extremities warm, no ulcers, no discoloration of skin.   Neurological: Gen: AA&O x 3, conversant, appropriate.      CN II-XII grossly intact.    Motor: RENDON x 4, 5/5 throughout UE/LE.    Sens: Sensation intact to light touch throughout.    DTRs: 2+ symmetric throughout.    Hoffmans negative bilaterally.  Plantar downgoing bilaterally.  No clonus.      No pronator drift, no dysmetria.  Skin: Warm, dry, no erythema.

## 2024-08-20 ENCOUNTER — TRANSCRIPTION ENCOUNTER (OUTPATIENT)
Age: 29
End: 2024-08-20

## 2024-08-20 VITALS
DIASTOLIC BLOOD PRESSURE: 76 MMHG | OXYGEN SATURATION: 98 % | RESPIRATION RATE: 17 BRPM | SYSTOLIC BLOOD PRESSURE: 142 MMHG | HEART RATE: 74 BPM

## 2024-08-20 LAB
ALBUMIN SERPL ELPH-MCNC: 4 G/DL — SIGNIFICANT CHANGE UP (ref 3.3–5)
ALP SERPL-CCNC: 82 U/L — SIGNIFICANT CHANGE UP (ref 40–120)
ALT FLD-CCNC: 32 U/L — SIGNIFICANT CHANGE UP (ref 10–45)
ANION GAP SERPL CALC-SCNC: 9 MMOL/L — SIGNIFICANT CHANGE UP (ref 5–17)
AST SERPL-CCNC: 140 U/L — HIGH (ref 10–40)
BILIRUB SERPL-MCNC: 0.2 MG/DL — SIGNIFICANT CHANGE UP (ref 0.2–1.2)
BUN SERPL-MCNC: 11 MG/DL — SIGNIFICANT CHANGE UP (ref 7–23)
CALCIUM SERPL-MCNC: 9.3 MG/DL — SIGNIFICANT CHANGE UP (ref 8.4–10.5)
CHLORIDE SERPL-SCNC: 103 MMOL/L — SIGNIFICANT CHANGE UP (ref 96–108)
CO2 SERPL-SCNC: 25 MMOL/L — SIGNIFICANT CHANGE UP (ref 22–31)
CREAT SERPL-MCNC: 1.09 MG/DL — SIGNIFICANT CHANGE UP (ref 0.5–1.3)
EGFR: 95 ML/MIN/1.73M2 — SIGNIFICANT CHANGE UP
GLUCOSE SERPL-MCNC: 122 MG/DL — HIGH (ref 70–99)
HCT VFR BLD CALC: 36.8 % — LOW (ref 39–50)
HGB BLD-MCNC: 11.7 G/DL — LOW (ref 13–17)
MAGNESIUM SERPL-MCNC: 2.2 MG/DL — SIGNIFICANT CHANGE UP (ref 1.6–2.6)
MCHC RBC-ENTMCNC: 26.2 PG — LOW (ref 27–34)
MCHC RBC-ENTMCNC: 31.8 GM/DL — LOW (ref 32–36)
MCV RBC AUTO: 82.3 FL — SIGNIFICANT CHANGE UP (ref 80–100)
NRBC # BLD: 0 /100 WBCS — SIGNIFICANT CHANGE UP (ref 0–0)
PHOSPHATE SERPL-MCNC: 3.7 MG/DL — SIGNIFICANT CHANGE UP (ref 2.5–4.5)
PLATELET # BLD AUTO: 258 K/UL — SIGNIFICANT CHANGE UP (ref 150–400)
POTASSIUM SERPL-MCNC: 3.9 MMOL/L — SIGNIFICANT CHANGE UP (ref 3.5–5.3)
POTASSIUM SERPL-SCNC: 3.9 MMOL/L — SIGNIFICANT CHANGE UP (ref 3.5–5.3)
PROT SERPL-MCNC: 7.3 G/DL — SIGNIFICANT CHANGE UP (ref 6–8.3)
RBC # BLD: 4.47 M/UL — SIGNIFICANT CHANGE UP (ref 4.2–5.8)
RBC # FLD: 14.1 % — SIGNIFICANT CHANGE UP (ref 10.3–14.5)
SODIUM SERPL-SCNC: 137 MMOL/L — SIGNIFICANT CHANGE UP (ref 135–145)
WBC # BLD: 12.12 K/UL — HIGH (ref 3.8–10.5)
WBC # FLD AUTO: 12.12 K/UL — HIGH (ref 3.8–10.5)

## 2024-08-20 PROCEDURE — 85027 COMPLETE CBC AUTOMATED: CPT

## 2024-08-20 PROCEDURE — 86900 BLOOD TYPING SEROLOGIC ABO: CPT

## 2024-08-20 PROCEDURE — 36415 COLL VENOUS BLD VENIPUNCTURE: CPT

## 2024-08-20 PROCEDURE — 88305 TISSUE EXAM BY PATHOLOGIST: CPT

## 2024-08-20 PROCEDURE — 80053 COMPREHEN METABOLIC PANEL: CPT

## 2024-08-20 PROCEDURE — 86901 BLOOD TYPING SEROLOGIC RH(D): CPT

## 2024-08-20 PROCEDURE — 88300 SURGICAL PATH GROSS: CPT

## 2024-08-20 PROCEDURE — 83735 ASSAY OF MAGNESIUM: CPT

## 2024-08-20 PROCEDURE — C1889: CPT

## 2024-08-20 PROCEDURE — 94640 AIRWAY INHALATION TREATMENT: CPT

## 2024-08-20 PROCEDURE — 84100 ASSAY OF PHOSPHORUS: CPT

## 2024-08-20 PROCEDURE — C9399: CPT

## 2024-08-20 PROCEDURE — 82962 GLUCOSE BLOOD TEST: CPT

## 2024-08-20 PROCEDURE — 88311 DECALCIFY TISSUE: CPT

## 2024-08-20 PROCEDURE — 86850 RBC ANTIBODY SCREEN: CPT

## 2024-08-20 RX ADMIN — Medication 100 MILLIGRAM(S): at 06:10

## 2024-08-20 RX ADMIN — Medication 3 SPRAY(S): at 06:10

## 2024-08-20 RX ADMIN — FLUTICASONE PROPIONATE 1 SPRAY(S): 50 SPRAY, METERED NASAL at 06:09

## 2024-08-20 RX ADMIN — Medication 3 SPRAY(S): at 01:07

## 2024-08-20 NOTE — PROGRESS NOTE ADULT - ASSESSMENT
Assessment and Plan:  TERESA PEREZ is a  29yMale  sp subtotal resection with full FESS and septoplasty 8/19.     PLAN:  - CTX while in house   - giron splints in place  nasal saline sprays   Monitor tachycardia   pain control     [facility] at [phone] with any questions/concerns.    Rosaura Pineda PA-C  08-20-24 @ 07:35

## 2024-08-20 NOTE — ASU DISCHARGE PLAN (ADULT/PEDIATRIC) - CARE PROVIDER_API CALL
Ching Dale  Otolaryngology  [address] [place] [address], [address]  [place] [address]  Phone: [phone]  Fax: [phone]  Follow Up Time:

## 2024-08-20 NOTE — DISCHARGE NOTE NURSING/CASE MANAGEMENT/SOCIAL WORK - PATIENT PORTAL LINK FT
You can access the FollowMyHealth Patient Portal offered by [facility] by registering at the following website: [email or web address]. By joining [employer]’s FollowMyHealth portal, you will also be able to view your health information using other applications (apps) compatible with our system.

## 2024-08-20 NOTE — DISCHARGE NOTE NURSING/CASE MANAGEMENT/SOCIAL WORK - NSDCPEFALRISK_GEN_ALL_CORE
For information on Fall & Injury Prevention, visit: [email or web address] OR  [email or web address] OR  https://www.cdc.gov/steadi/patient.html

## 2024-08-20 NOTE — ASU DISCHARGE PLAN (ADULT/PEDIATRIC) - NS MD DC FALL RISK RISK
For information on Fall & Injury Prevention, visit: [email or web address] OR  [email or web address] OR  https://www.cdc.gov/steadi/patient.html

## 2024-08-20 NOTE — ASU DISCHARGE PLAN (ADULT/PEDIATRIC) - ASU DC SPECIAL INSTRUCTIONSFT
ENT Discharge Instructions    ENT follow up appointment:  - please call the office to confirm appointment:   Please take medications that Dr. Dale sent     *Please call your doctor or nurse practitioner if you have increased pain, swelling, redness, or drainage from the incision site.  *You may shower, and wash surgical incisions with a mild soap and warm water. Gently pat the area dry.  *If you have steri-strips, they will fall off on their own. Please remove any remaining strips 7-10 days after surgery.    Activity:  - fatigue is common after surgery, rest if you feel tired   -Please get plenty of rest, continue to ambulate several times per day, and drink adequate amounts of fluids.   -Avoid lifting weights greater than 5-10 lbs until you follow-up with your surgeon, who will instruct you further regarding activity restrictions.  - Walking is recommended, ambulate as tolerated    Medications: Please resume all regular home medications unless specifically advised not to take a particular medication.     Warning Signs:  Please call your doctor or nurse practitioner if you experience the following:  *You experience new chest pain, pressure, squeezing or tightness.  *New or worsening cough, shortness of breath, or wheeze.  *If you are vomiting and cannot keep down fluids or your medications.  *You are getting dehydrated due to continued vomiting, diarrhea, or other reasons. Signs of dehydration include dry mouth, rapid heartbeat, or feeling dizzy or faint when standing.  *Your pain is not improving within 8-12 hours or is not gone within 24 hours.  *You have shaking chills, or fever greater than 101.5 degrees Fahrenheit or 38 degrees Celsius.  *Any change in your symptoms, or any new symptoms that concern you.     PLEASE CALL THE OFFICE WITH ANY QUESTIONS OR CONCERNS: ENT Discharge Instructions    ENT follow up appointment:  - please call the office to confirm appointment:   Please take medications that Dr. Dale sent   Please continue nasal saline sprays 4x a day     *Please call your doctor or nurse practitioner if you have increased pain, swelling, redness, or drainage from the incision site.  *You may shower, and wash surgical incisions with a mild soap and warm water. Gently pat the area dry.  *If you have steri-strips, they will fall off on their own. Please remove any remaining strips 7-10 days after surgery.    Activity:  - fatigue is common after surgery, rest if you feel tired   -Please get plenty of rest, continue to ambulate several times per day, and drink adequate amounts of fluids.   -Avoid lifting weights greater than 5-10 lbs until you follow-up with your surgeon, who will instruct you further regarding activity restrictions.  - Walking is recommended, ambulate as tolerated    Medications: Please resume all regular home medications unless specifically advised not to take a particular medication.     Warning Signs:  Please call your doctor or nurse practitioner if you experience the following:  *You experience new chest pain, pressure, squeezing or tightness.  *New or worsening cough, shortness of breath, or wheeze.  *If you are vomiting and cannot keep down fluids or your medications.  *You are getting dehydrated due to continued vomiting, diarrhea, or other reasons. Signs of dehydration include dry mouth, rapid heartbeat, or feeling dizzy or faint when standing.  *Your pain is not improving within 8-12 hours or is not gone within 24 hours.  *You have shaking chills, or fever greater than 101.5 degrees Fahrenheit or 38 degrees Celsius.  *Any change in your symptoms, or any new symptoms that concern you.     PLEASE CALL THE OFFICE WITH ANY QUESTIONS OR CONCERNS:

## 2024-08-20 NOTE — PROGRESS NOTE ADULT - SUBJECTIVE AND OBJECTIVE BOX
NEUROSURGERY POST OP NOTE:    POD# 0 S/P endonasal approach for resection of frontal sinus lesion with ENT, Dr. Dale.    S: Patient seen and examined at bedside in PACU. Patient denies any complaints at this time and reports pain is adequately controlled.      T(C): 36.7 (08-19-24 @ 15:45), Max: 36.7 (08-19-24 @ 13:56)  HR: 98 (08-19-24 @ 15:45) (89 - 110)  BP: 156/86 (08-19-24 @ 15:45) (146/86 - 172/105)  RR: 14 (08-19-24 @ 15:45) (12 - 23)  SpO2: 97% (08-19-24 @ 15:45) (96% - 100%)      08-19-24 @ 07:01  -  08-19-24 @ 16:24  --------------------------------------------------------  IN: 360 mL / OUT: 0 mL / NET: 360 mL        acetaminophen     Tablet .. 650 milliGRAM(s) Oral every 6 hours PRN  cefTRIAXone   IVPB 2000 milliGRAM(s) IV Intermittent every 12 hours  fluticasone propionate 50 MICROgram(s)/spray Nasal Spray 1 Spray(s) Both Nostrils two times a day  HYDROmorphone  Injectable 0.2 milliGRAM(s) IV Push every 6 hours PRN  lactated ringers. 1000 milliLiter(s) IV Continuous <Continuous>  lidocaine   4% Patch 1 Patch Transdermal daily  ondansetron Injectable 4 milliGRAM(s) IV Push every 6 hours PRN  oxyCODONE    IR 5 milliGRAM(s) Oral every 6 hours PRN  polyethylene glycol 3350 17 Gram(s) Oral at bedtime  senna 1 Tablet(s) Oral at bedtime  sodium chloride 0.65% Nasal 3 Spray(s) Both Nostrils every 4 hours      RADIOLOGY:     Exam:  Constitutional: Patient is resting comfortably in stretcher, in no acute distress.  Respiratory: breathing non-labored, symmetrical chest wall movement  Cardiovascuar: RRR, no murmurs  Gastrointestinal: abdomen soft, non tender  Genitourinary: exam deffered  Neurological:  Opens eyes spontaneously. AAOX3. Following commands. Verbal function intact  Cranial Nerves: II-XII grossly intact, pupils 4mm equal, round and briskly reactive to light. EOMI. Facial movements symmetric. Tongue midline. Speech clear.  Motor: 5/5 power in b/l UE and LE  Sensation: intact to light touch in all extremities  Pronator Drift: none.      DEVICES:       Assessment: 30 y/o M with PMHx of severe obstructive sleep apnea and atrial fibrillation, was diagnosed with a frontal sinus osteoma during an evaluation for ear pain, chronic discolored rhinorrhea, and occasional midfacial headaches. CT scan on 6/25/24 showed pansinus inflammatory mucosal thickening, mucoid content, and multi-outflow narrowing, particularly on the left side. The 3.4 cm osteoma, located in the right frontal sinus and partially crossing the midline, does not obstruct the outflow. Additional findings include nasal turbinate hypertrophy. Now s/p endonasal approach for resection of frontal sinus lesion    -care per primary team  -neurosurgery to continue to follow    D/w Dr. D'Amico    
OTOLARYNGOLOGY (ENT) PROGRESS NOTE    PATIENT: TERESA PEREZ  MRN: [id number]  : 96  [id number]  DATE OF SERVICE:  24  			         ID:TERESA PEREZ is a 29-year-old male, non-smoker, with a history of severe obstructive sleep apnea and asymptomatic atrial fibrillation, was diagnosed with a frontal sinus osteoma during an evaluation for ear pain, chronic discolored rhinorrhea, and occasional midfacial headaches. A CT scan on 24 showed pansinus inflammatory mucosal thickening, mucoid content, and multi-outflow narrowing, particularly on the left side. The 3.4 cm osteoma, located in the right frontal sinus and partially crossing the midline, does not obstruct the outflow. Additional findings include nasal turbinate hypertrophy. Patient is now  sp subtotal resection with full FESS and septoplasty .     ALLERGIES:  No Known Allergies      MEDICATIONS:  Antiinfectives:   cefTRIAXone   IVPB 2000 milliGRAM(s) IV Intermittent every 12 hours    IV fluids:    Hematologic/Anticoagulation:    Pain medications/Neuro:  acetaminophen     Tablet .. 650 milliGRAM(s) Oral every 6 hours PRN  HYDROmorphone  Injectable 0.2 milliGRAM(s) IV Push every 6 hours PRN  ondansetron Injectable 4 milliGRAM(s) IV Push every 6 hours PRN  oxyCODONE    IR 5 milliGRAM(s) Oral every 6 hours PRN    Endocrine Medications:     All other standing medications:   fluticasone propionate 50 MICROgram(s)/spray Nasal Spray 1 Spray(s) Both Nostrils two times a day  lidocaine   4% Patch 1 Patch Transdermal daily  polyethylene glycol 3350 17 Gram(s) Oral at bedtime  senna 1 Tablet(s) Oral at bedtime  sodium chloride 0.65% Nasal 3 Spray(s) Both Nostrils every 4 hours    All other PRN medications:    Vital Signs Last 24 Hrs  T(C): 36.9 (20 Aug 2024 04:48), Max: 36.9 (19 Aug 2024 22:08)  T(F): 98.5 (20 Aug 2024 04:48), Max: 98.5 (20 Aug 2024 04:48)  HR: 78 (20 Aug 2024 05:00) (78 - 110)  BP: 145/77 (20 Aug 2024 05:00) (145/77 - 172/105)  BP(mean): 103 (20 Aug 2024 05:00) (101 - 132)  RR: 18 (20 Aug 2024 05:00) (12 - 23)  SpO2: 96% (20 Aug 2024 05:00) (96% - 100%)    Parameters below as of 20 Aug 2024 05:00  Patient On (Oxygen Delivery Method): face tent          - @ 07:01  -   @ 07:00  --------------------------------------------------------  IN:    Lactated Ringers: 525 mL    Oral Fluid: 60 mL  Total IN: 585 mL    OUT:    Voided (mL): 2300 mL  Total OUT: 2300 mL    Total NET: -1715 mL                  PHYSICAL EXAM:  GEN: appears stated age, NAD  NEURO: alert & oriented x   HEENT: b/ll giron splints in place   Pulm: normal respiratory excursions, not tachypneic, no labored breathing  Abd: non-distended  Ext: moving all four extremities, no peripheral edema noted       LABS                       11.7   12.12 )-----------( 258      ( 20 Aug 2024 06:35 )             36.8    08-20    137  |  103  |  11  ----------------------------<  122<H>  3.9   |  25  |  1.09    Ca    9.3      20 Aug 2024 06:35  Phos  3.7     08-20  Mg     2.2     08-20    TPro  7.3  /  Alb  4.0  /  TBili  0.2  /  DBili  x   /  AST  140<H>  /  ALT  32  /  AlkPhos  82  08-20         Coagulation Studies-     Urinalysis Basic - ( 20 Aug 2024 06:35 )    Color: x / Appearance: x / SG: x / pH: x  Gluc: 122 mg/dL / Ketone: x  / Bili: x / Urobili: x   Blood: x / Protein: x / Nitrite: x   Leuk Esterase: x / RBC: x / WBC x   Sq Epi: x / Non Sq Epi: x / Bacteria: x      Endocrine Panel-  Calcium: 9.3 mg/dL ( @ 06:35)

## 2024-08-20 NOTE — PROGRESS NOTE ADULT - REASON FOR ADMISSION
frontal sinus osteoma endoscopic transnasal removal
frontal sinus osteoma endoscopic transnasal removal

## 2024-08-21 LAB — SURGICAL PATHOLOGY STUDY: SIGNIFICANT CHANGE UP

## 2024-08-23 ENCOUNTER — APPOINTMENT (OUTPATIENT)
Dept: HEART AND VASCULAR | Facility: CLINIC | Age: 29
End: 2024-08-23
Payer: COMMERCIAL

## 2024-08-23 DIAGNOSIS — R94.31 ABNORMAL ELECTROCARDIOGRAM [ECG] [EKG]: ICD-10-CM

## 2024-08-23 PROCEDURE — 99442: CPT

## 2024-08-24 PROBLEM — Z78.9 OTHER SPECIFIED HEALTH STATUS: Chronic | Status: ACTIVE | Noted: 2024-08-16

## 2024-08-27 ENCOUNTER — APPOINTMENT (OUTPATIENT)
Dept: OTOLARYNGOLOGY | Facility: CLINIC | Age: 29
End: 2024-08-27
Payer: COMMERCIAL

## 2024-08-27 DIAGNOSIS — D16.9 BENIGN NEOPLASM OF BONE AND ARTICULAR CARTILAGE, UNSPECIFIED: ICD-10-CM

## 2024-08-27 DIAGNOSIS — G47.33 OBSTRUCTIVE SLEEP APNEA (ADULT) (PEDIATRIC): ICD-10-CM

## 2024-08-27 DIAGNOSIS — E66.9 OBESITY, UNSPECIFIED: ICD-10-CM

## 2024-08-27 DIAGNOSIS — D49.1 NEOPLASM OF UNSPECIFIED BEHAVIOR OF RESPIRATORY SYSTEM: ICD-10-CM

## 2024-08-27 DIAGNOSIS — J34.3 HYPERTROPHY OF NASAL TURBINATES: ICD-10-CM

## 2024-08-27 PROCEDURE — 31237 NSL/SINS NDSC SURG BX POLYPC: CPT | Mod: 50,79

## 2024-08-27 NOTE — PROCEDURE
[FreeTextEntry3] : HISTORY OF PRESENT ILLNESS   Thomas Sosa is a 30 y/o male who is s/p intracranial fossa tumor removal, FESS and septoplasty  on 8/12/2024. Currently on clindamycin, pred 20, bactrim, NSI . Complaints: difficulty sleeping, pain is controlled   Physical Exam General: AAO x 3, WDWN  Ears: Canals clear, TM;s nrml  Nose: See endoscopy  Throat: uvula midline. No masses or lesions  Eyes: PERRL, EOMI  Neuro: Gross nrml, CN 2-12 intact  Resp: Nrml chest excursion  Skin: Appears intact   Procedure Note   PROCEDURE: Nasal/sinus endoscopy, surgical, with debridement (24408-09-74)   INDICATION: CRSsNP, frontal osteoma    Prior to the procedure, I had a discussion with the patient regarding the risks, benefits, and alternatives of the debridement and they signed a consent.   SURGEON: Dr. Matt Dale   PROCEDURE DETAIL: After obtaining adequate decongestion of the nasal mucosa with ephedrine nasal spray, and adequate anesthesia with 2% topical Lidocaine nasal spray, the nasal endoscope was used to examine the nasal passages and paranasal sinuses. Using a combination of suction, grasping instruments and swabs, the maxillary, ethmoid, frontal and sphenoid sinuses were debrided bilaterally of pus, crust, debris, clots and polyps to prevent scar formation, continued sinusitis and mucocele formation. At the end of this procedure, all operated sinuses were patent. The endoscope was withdrawn, and the patient tolerated the procedure well. No complications were encountered.   INSTRUMENTS: rigid 45   ENDOSCOPIC FINDINGS: doyles removed. extensive debris removed. all operated sinuses widely patent   Impression: Thomas Sosa is a 30 y/o male who is s/p intracranial fossa tumor removal, FESS and septoplasty on 8/12/2024. overall doing well   Plan:  - complete course of abx - continue prednisone  - continue NSI - advised him to take pain medications prior to next visit - RTC in 1 week   The patient was given an opportunity to ask questions, and all questions asked were answered to the best of my ability.     Matt Dale MD [facility]ology and Sk[facility] Surgery Department of Otolaryngology- Head and Neck Surgery [facility]

## 2024-08-28 DIAGNOSIS — I48.91 UNSPECIFIED ATRIAL FIBRILLATION: ICD-10-CM

## 2024-08-28 DIAGNOSIS — J32.1 CHRONIC FRONTAL SINUSITIS: ICD-10-CM

## 2024-08-28 DIAGNOSIS — J34.3 HYPERTROPHY OF NASAL TURBINATES: ICD-10-CM

## 2024-08-28 DIAGNOSIS — D16.4 BENIGN NEOPLASM OF BONES OF SKULL AND FACE: ICD-10-CM

## 2024-08-28 DIAGNOSIS — Z79.899 OTHER LONG TERM (CURRENT) DRUG THERAPY: ICD-10-CM

## 2024-08-28 DIAGNOSIS — G47.33 OBSTRUCTIVE SLEEP APNEA (ADULT) (PEDIATRIC): ICD-10-CM

## 2024-08-30 ENCOUNTER — TRANSCRIPTION ENCOUNTER (OUTPATIENT)
Age: 29
End: 2024-08-30

## 2024-09-03 ENCOUNTER — TRANSCRIPTION ENCOUNTER (OUTPATIENT)
Age: 29
End: 2024-09-03

## 2024-09-04 ENCOUNTER — APPOINTMENT (OUTPATIENT)
Dept: ENDOCRINOLOGY | Facility: CLINIC | Age: 29
End: 2024-09-04
Payer: COMMERCIAL

## 2024-09-04 VITALS
TEMPERATURE: 97.5 F | SYSTOLIC BLOOD PRESSURE: 151 MMHG | BODY MASS INDEX: 39.22 KG/M2 | DIASTOLIC BLOOD PRESSURE: 101 MMHG | HEIGHT: 70 IN | WEIGHT: 274 LBS | HEART RATE: 78 BPM | OXYGEN SATURATION: 99 %

## 2024-09-04 DIAGNOSIS — E11.9 TYPE 2 DIABETES MELLITUS W/OUT COMPLICATIONS: ICD-10-CM

## 2024-09-04 DIAGNOSIS — E66.01 MORBID (SEVERE) OBESITY DUE TO EXCESS CALORIES: ICD-10-CM

## 2024-09-04 DIAGNOSIS — Z78.9 OTHER SPECIFIED HEALTH STATUS: ICD-10-CM

## 2024-09-04 PROCEDURE — 36415 COLL VENOUS BLD VENIPUNCTURE: CPT

## 2024-09-04 PROCEDURE — 99205 OFFICE O/P NEW HI 60 MIN: CPT

## 2024-09-04 RX ORDER — METFORMIN HYDROCHLORIDE 500 MG/1
500 TABLET, COATED ORAL
Qty: 1 | Refills: 0 | Status: ACTIVE | COMMUNITY
Start: 2024-09-04 | End: 1900-01-01

## 2024-09-04 NOTE — ASSESSMENT
[Long Term Vascular Complications] : long term vascular complications of diabetes [Carbohydrate Consistent Diet] : carbohydrate consistent diet [Importance of Diet and Exercise] : importance of diet and exercise to improve glycemic control, achieve weight loss and improve cardiovascular health [Weight Loss] : weight loss [FreeTextEntry1] : Patient is a 28 yo man here for endocrine consultation  1. BMI 39/Class II Obesity Patient states significant weight gain since coming to [place]. Comorbidities include sleep apnea, possible diabetes (see below).  -The patient's food recall was reviewed and suspect weight gain, or inability to lose weight, is due to calorie excess. Diet is very high in sugar sweetened beverages (SSBs) in the form of juice, alcohol, soda.  Strongly recommend eliminating SSB or cutting down both for calorie reduction but importantly for possible diabetes -recommend nutritional counseling; referral provided -discussed concept of calorie reduction -discussed various diets including IF, keto, Atkins, vegan.  Educated that extreme diets/cleanses/very restrictive meals programs may work but are difficult to sustain in the long-run. -we discussed medical management including GLP/GLP+GIP injectables and metformin.  Patient is motivated to change and would like a trial of lifestyle changes. He is amenable to metformin -GI side effects of metformin discussed -start 500 mg BID, Rx sent to pharmacy -SMART goal: patient states "I will cut down on juices and have one juice box a day. This is important because it will prevent insulin spikes and will be better for me."    2. Type 2 diabetes -he had serum A1c done July 2024 which was 6.5%. Technically, 2 values are needed to confirm diagnosis of type 2 diabetes. I will check serum A1c level today -he is on prednisone and we discussed prednisone can increase glucose and appetite. Short duration is ideal-managed by ENT -for diabetes, the importance of consistent carbohydrate and sugar limited diet was discussed -start metformin -encourage healthy plate diet: majority of plate green leafy vegetables, 1/4 of plate healthy complex portion-controlled carbohydrates and the other remaining 1/4 lean protein -once serum A1c results and diabetes is confirmed, he will need dilated eye exam, nephropathy screening  Patient is aware that I am leaving [facility]. Information for [address] [place] [address] to establish new endocrine care provided today. Rx for metformin sent to pharmacy

## 2024-09-04 NOTE — PHYSICAL EXAM
[Alert] : alert [No Acute Distress] : no acute distress [No Respiratory Distress] : no respiratory distress [Clear to Auscultation] : lungs were clear to auscultation bilaterally [Normal Rate] : heart rate was normal [Normal Bowel Sounds] : normal bowel sounds [Soft] : abdomen soft [Normal Affect] : the affect was normal [Normal Mood] : the mood was normal [de-identified] : BMI 39

## 2024-09-04 NOTE — HISTORY OF PRESENT ILLNESS
[FreeTextEntry1] : Patient is a 28 yo man here for weight management and diabetes  He saw Dr. Dale for sleep apnea and sinus issues. The patient had sinus surgery and during the work up was told he had prediabetes.  Prior to these new issues, he did not have a regular primary care doctor until very recently.  He was told that he had "prediabetes and was right on the border."  The patient was told it could be due to sleep apnea.  Patient is waiting for his CPAP. Reports that he did track in high school and was active in high school. His senior year, he was less active and was about 160 lbs.  Patient went to college and gained 30-40+ pounds as he moved to the  form the [employer] for college.  There was one moment in college, that he realized that he was > 200 lbs.  Right now is the highest weight he has been. There has been accumulating weight over the years since his move to the .   Patient has tried to do periodic diets (cut carbs, increase protein intake and play tennis) for short stints.  Over the past few weeks he has improved monitoring of diet-and lost 5 lbs.   Reports father is overweight; some aunts and uncles who are "heavier." He has a younger brother who also has been slowly gaining weight as well.   Currently on prednisone 20 mg post operatively 24 hour food recall Breakfast: does not usually eat breakfast but friends were in town recently; steak, potato hash, eggs and mimosas (made at home) Lunch: skipped Dinner: bowl of steak and rice Drinks Sprite soda a few times a week; drinks box of [employer] juice; has mai sun from time to time purchased at [employer]. Eats dinner around 6 PM weekdays, 7-8 PM on the weekends if going out.  Time depends. Has a tendency to drink low/zero sugar drinks Patient drinks a lot of cocktails.  Will make or buy gin based cocktail, tequila drink, rum, daiquiris.  Drinks 4 drinks when going out.   Snacks: nuts, pistachios, likes potato chips. Enjoys savory/salty foods. Tries to not keep them in the house but had them over the weekend recently.  He does not snack too much but meals are heavy in portions Outside foods: depends on the stress level. He used to eat out very frequently.  Goes out on dates about 2-3 times a week, orders ub[employer] eats 2 times a week, office lunch is catered. Patient cooks twice a week. Exercise: last year went to a  but no exercise this year

## 2024-09-04 NOTE — END OF VISIT
Health Maintenance Due   Topic Date Due   • Hepatitis B Vaccine (1 of 3 - 3-dose series) Never done   • COVID-19 Vaccine (1) Never done   • Pneumococcal Vaccine 0-64 (1 - PCV) Never done   • DTaP/Tdap/Td Vaccine (1 - Tdap) Never done       Patient is due for topics as listed above but is not proceeding with Immunization(s) COVID-19, Dtap/Tdap/Td, Hep B and Pneumococcal at this time.      [Time Spent: ___ minutes] : I have spent [unfilled] minutes of time on the encounter which excludes teaching and separately reported services.

## 2024-09-04 NOTE — REASON FOR VISIT
[Initial Evaluation] : an initial evaluation [DM Type 2] : DM Type 2 [Weight Management/Obesity] : weight management/obesity [FreeTextEntry2] :

## 2024-09-05 ENCOUNTER — APPOINTMENT (OUTPATIENT)
Dept: OTOLARYNGOLOGY | Facility: CLINIC | Age: 29
End: 2024-09-05
Payer: COMMERCIAL

## 2024-09-05 VITALS — BODY MASS INDEX: 39.37 KG/M2 | WEIGHT: 275 LBS | HEIGHT: 70 IN

## 2024-09-05 LAB
ESTIMATED AVERAGE GLUCOSE: 143 MG/DL
HBA1C MFR BLD HPLC: 6.6 %

## 2024-09-05 PROCEDURE — 31237 NSL/SINS NDSC SURG BX POLYPC: CPT | Mod: 50,79

## 2024-09-05 RX ORDER — BUDESONIDE 0.5 MG/2ML
0.5 INHALANT ORAL
Qty: 120 | Refills: 2 | Status: ACTIVE | COMMUNITY
Start: 2024-09-05 | End: 1900-01-01

## 2024-09-06 NOTE — PROCEDURE
[FreeTextEntry3] : HISTORY OF PRESENT ILLNESS   Thomas Sosa is a 30 y/o male who is s/p intracranial fossa tumor removal, FESS and septoplasty  on 8/12/2024. Currently on pred 20, NSI. Complaints: difficulty sleeping, pain is controlled   Physical Exam General: AAO x 3, WDWN  Ears: Canals clear, TM;s nrml  Nose: See endoscopy  Throat: uvula midline. No masses or lesions  Eyes: PERRL, EOMI  Neuro: Gross nrml, CN 2-12 intact  Resp: Nrml chest excursion  Skin: Appears intact   Procedure Note   PROCEDURE: Nasal/sinus endoscopy, surgical, with debridement (24146-56-17)   INDICATION: CRSsNP, frontal osteoma    Prior to the procedure, I had a discussion with the patient regarding the risks, benefits, and alternatives of the debridement and they signed a consent.   SURGEON: Dr. Matt Dale   PROCEDURE DETAIL: After obtaining adequate decongestion of the nasal mucosa with ephedrine nasal spray, and adequate anesthesia with 2% topical Lidocaine nasal spray, the nasal endoscope was used to examine the nasal passages and paranasal sinuses. Using a combination of suction, grasping instruments and swabs, the maxillary, ethmoid, frontal and sphenoid sinuses were debrided bilaterally of pus, crust, debris, clots and polyps to prevent scar formation, continued sinusitis and mucocele formation. At the end of this procedure, all operated sinuses were patent. The endoscope was withdrawn, and the patient tolerated the procedure well. No complications were encountered.   INSTRUMENTS: rigid 45   ENDOSCOPIC FINDINGS: thin silastics removed. extensive debris removed. all operated sinuses widely patent   Impression: Thomas Sosa is a 30 y/o male who is s/p intracranial fossa tumor removal, FESS and septoplasty on 8/12/2024. overall doing well   Plan: - continue prednisone 10mg for a week, then 5mg for a week then off - continue NSI - advised him to take pain medications prior to next visit - RTC in 1 weeks   The patient was given an opportunity to ask questions, and all questions asked were answered to the best of my ability.     Matt Dale MD [facility]inology and Skull [facility] Surgery Department of Otolaryngology- Head and Neck Surgery [facility]

## 2024-09-06 NOTE — PROCEDURE
[FreeTextEntry3] : HISTORY OF PRESENT ILLNESS   Thomas Sosa is a 30 y/o male who is s/p intracranial fossa tumor removal, FESS and septoplasty  on 8/12/2024. Currently on pred 20, NSI. Complaints: difficulty sleeping, pain is controlled   Physical Exam General: AAO x 3, WDWN  Ears: Canals clear, TM;s nrml  Nose: See endoscopy  Throat: uvula midline. No masses or lesions  Eyes: PERRL, EOMI  Neuro: Gross nrml, CN 2-12 intact  Resp: Nrml chest excursion  Skin: Appears intact   Procedure Note   PROCEDURE: Nasal/sinus endoscopy, surgical, with debridement ([id number])   INDICATION: CRSsNP, frontal osteoma    Prior to the procedure, I had a discussion with the patient regarding the risks, benefits, and alternatives of the debridement and they signed a consent.   SURGEON: Dr. Matt Dale   PROCEDURE DETAIL: After obtaining adequate decongestion of the nasal mucosa with ephedrine nasal spray, and adequate anesthesia with 2% topical Lidocaine nasal spray, the nasal endoscope was used to examine the nasal passages and paranasal sinuses. Using a combination of suction, grasping instruments and swabs, the maxillary, ethmoid, frontal and sphenoid sinuses were debrided bilaterally of pus, crust, debris, clots and polyps to prevent scar formation, continued sinusitis and mucocele formation. At the end of this procedure, all operated sinuses were patent. The endoscope was withdrawn, and the patient tolerated the procedure well. No complications were encountered.   INSTRUMENTS: rigid 45   ENDOSCOPIC FINDINGS: thin silastics removed. extensive debris removed. all operated sinuses widely patent   Impression: Thomas Sosa is a 30 y/o male who is s/p intracranial fossa tumor removal, FESS and septoplasty on 8/12/2024. overall doing well   Plan: - continue prednisone 10mg for a week, then 5mg for a week then off - continue NSI - advised him to take pain medications prior to next visit - RTC in 1 weeks   The patient was given an opportunity to ask questions, and all questions asked were answered to the best of my ability.     Matt Dale MD [facility] Rhinology and Skull Base Surgery Department of Otolaryngology- Head and Neck Surgery [facility]

## 2024-09-06 NOTE — PROCEDURE
[FreeTextEntry3] : HISTORY OF PRESENT ILLNESS   Thomas Sosa is a 28 y/o male who is s/p intracranial fossa tumor removal, FESS and septoplasty  on 8/12/2024. Currently on pred 20, NSI. Complaints: difficulty sleeping, pain is controlled   Physical Exam General: AAO x 3, WDWN  Ears: Canals clear, TM;s nrml  Nose: See endoscopy  Throat: uvula midline. No masses or lesions  Eyes: PERRL, EOMI  Neuro: Gross nrml, CN 2-12 intact  Resp: Nrml chest excursion  Skin: Appears intact   Procedure Note   PROCEDURE: Nasal/sinus endoscopy, surgical, with debridement (66828-12-24)   INDICATION: CRSsNP, frontal osteoma    Prior to the procedure, I had a discussion with the patient regarding the risks, benefits, and alternatives of the debridement and they signed a consent.   SURGEON: Dr. Matt Dale   PROCEDURE DETAIL: After obtaining adequate decongestion of the nasal mucosa with ephedrine nasal spray, and adequate anesthesia with 2% topical Lidocaine nasal spray, the nasal endoscope was used to examine the nasal passages and paranasal sinuses. Using a combination of suction, grasping instruments and swabs, the maxillary, ethmoid, frontal and sphenoid sinuses were debrided bilaterally of pus, crust, debris, clots and polyps to prevent scar formation, continued sinusitis and mucocele formation. At the end of this procedure, all operated sinuses were patent. The endoscope was withdrawn, and the patient tolerated the procedure well. No complications were encountered.   INSTRUMENTS: rigid 45   ENDOSCOPIC FINDINGS: thin silastics removed. extensive debris removed. all operated sinuses widely patent   Impression: Thomas Sosa is a 28 y/o male who is s/p intracranial fossa tumor removal, FESS and septoplasty on 8/12/2024. overall doing well   Plan: - continue prednisone 10mg for a week, then 5mg for a week then off - continue NSI - advised him to take pain medications prior to next visit - RTC in 1 weeks   The patient was given an opportunity to ask questions, and all questions asked were answered to the best of my ability.     Matt Dale MD [facility] Rhinology and Skull Base Surgery Department of Otolaryngology- Head and Neck Surgery [facility]

## 2024-09-12 ENCOUNTER — APPOINTMENT (OUTPATIENT)
Dept: OTOLARYNGOLOGY | Facility: CLINIC | Age: 29
End: 2024-09-12
Payer: COMMERCIAL

## 2024-09-12 VITALS — WEIGHT: 275 LBS | BODY MASS INDEX: 39.37 KG/M2 | HEIGHT: 70 IN

## 2024-09-12 DIAGNOSIS — J32.0 CHRONIC MAXILLARY SINUSITIS: ICD-10-CM

## 2024-09-12 DIAGNOSIS — D49.1 NEOPLASM OF UNSPECIFIED BEHAVIOR OF RESPIRATORY SYSTEM: ICD-10-CM

## 2024-09-12 DIAGNOSIS — J32.3 CHRONIC SPHENOIDAL SINUSITIS: ICD-10-CM

## 2024-09-12 DIAGNOSIS — J32.2 CHRONIC ETHMOIDAL SINUSITIS: ICD-10-CM

## 2024-09-12 DIAGNOSIS — G47.33 OBSTRUCTIVE SLEEP APNEA (ADULT) (PEDIATRIC): ICD-10-CM

## 2024-09-12 DIAGNOSIS — J34.2 DEVIATED NASAL SEPTUM: ICD-10-CM

## 2024-09-12 DIAGNOSIS — J32.1 CHRONIC FRONTAL SINUSITIS: ICD-10-CM

## 2024-09-12 PROCEDURE — 99213 OFFICE O/P EST LOW 20 MIN: CPT | Mod: 25

## 2024-09-12 PROCEDURE — 31231 NASAL ENDOSCOPY DX: CPT | Mod: 58

## 2024-09-12 NOTE — PROCEDURE
[FreeTextEntry3] : HISTORY OF PRESENT ILLNESS   Thomas Sosa is a 28 y/o male who is s/p intracranial fossa tumor removal, FESS and septoplasty  on 8/12/2024. Currently on pred 10, NSI w/budesonide. Complaints: difficulty sleeping, pain is controlled   Physical Exam General: AAO x 3, WDWN  Ears: Canals clear, TM;s nrml  Nose: See endoscopy  Throat: uvula midline. No masses or lesions  Eyes: PERRL, EOMI  Neuro: Gross nrml, CN 2-12 intact  Resp: Nrml chest excursion  Skin: Appears intact   Procedure Note   PROCEDURE: Nasal/sinus endoscopy, surgical, with debridement ([id number])   INDICATION: CRSsNP, frontal osteoma    Prior to the procedure, I had a discussion with the patient regarding the risks, benefits, and alternatives of the debridement and they signed a consent.   SURGEON: Dr. Matt Dale   PROCEDURE DETAIL: After obtaining adequate decongestion of the nasal mucosa with ephedrine nasal spray, and adequate anesthesia with 2% topical Lidocaine nasal spray, the nasal endoscope was used to examine the nasal passages and paranasal sinuses. Using a combination of suction, grasping instruments and swabs, the maxillary, ethmoid, frontal and sphenoid sinuses were debrided bilaterally of pus, crust, debris, clots and polyps to prevent scar formation, continued sinusitis and mucocele formation. At the end of this procedure, all operated sinuses were patent. The endoscope was withdrawn, and the patient tolerated the procedure well. No complications were encountered.   INSTRUMENTS: rigid 45   ENDOSCOPIC FINDINGS:  extensive debris removed. frontals are now open. all operated sinuses widely patent   Impression: Thomas Sosa is a 28 y/o male who is s/p intracranial fossa tumor removal, FESS and septoplasty on 8/12/2024. overall doing well   Plan: - taper to 5mg of prednisone until next visit - he would like to return next week - continue NSI with budesonide - advised him to take pain medications prior to next visit - RTC in 1 week   The patient was given an opportunity to ask questions, and all questions asked were answered to the best of my ability.     Matt Dale MD [facility] Rhinology and Skull Base Surgery Department of Otolaryngology- Head and Neck Surgery [facility]

## 2024-09-23 ENCOUNTER — APPOINTMENT (OUTPATIENT)
Dept: OTOLARYNGOLOGY | Facility: CLINIC | Age: 29
End: 2024-09-23
Payer: COMMERCIAL

## 2024-09-23 DIAGNOSIS — J32.2 CHRONIC ETHMOIDAL SINUSITIS: ICD-10-CM

## 2024-09-23 DIAGNOSIS — J32.0 CHRONIC MAXILLARY SINUSITIS: ICD-10-CM

## 2024-09-23 DIAGNOSIS — J34.3 HYPERTROPHY OF NASAL TURBINATES: ICD-10-CM

## 2024-09-23 DIAGNOSIS — J32.3 CHRONIC SPHENOIDAL SINUSITIS: ICD-10-CM

## 2024-09-23 DIAGNOSIS — D49.1 NEOPLASM OF UNSPECIFIED BEHAVIOR OF RESPIRATORY SYSTEM: ICD-10-CM

## 2024-09-23 DIAGNOSIS — J32.1 CHRONIC FRONTAL SINUSITIS: ICD-10-CM

## 2024-09-23 PROCEDURE — 31231 NASAL ENDOSCOPY DX: CPT | Mod: 58

## 2024-09-23 PROCEDURE — 99213 OFFICE O/P EST LOW 20 MIN: CPT | Mod: 25

## 2024-09-23 NOTE — HISTORY OF PRESENT ILLNESS
[de-identified] : CC: frontal sinus mass   HISTORY OF PRESENT ILLNESS: Mr. Sosa is a pleasant 29 year old gentleman with frontal sinus mass referred by Dr. 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 Briseno for UPPP and ETBD  6 week f/u s/p intracranial fossa tumor removal, FESS and septoplasty on 8/12/2024 on budesonide  he is doing well, sleeping and breathing better. he is irrigating 2-3x/day with budesonide  REVIEW OF SYSTEMS: General ROS: negative for - chills, fatigue or fever Psychological ROS: negative for - anxiety or depression Ophthalmic ROS: negative for - blurry vision, decreased vision or double vision ENT ROS: negative except as noted from HPI Allergy and Immunology ROS: negative except as noted from HPI Hematological and Lymphatic ROS: negative for - bleeding problems Endocrine ROS: negative for - malaise/lethargy Respiratory ROS: negative for - stridor Cardiovascular ROS: negative for - chest pain Gastrointestinal ROS: negative for - appetite loss or nausea/vomiting Genitourinary ROS: negative for - incontinence Musculoskeletal ROS: negative for - gait disturbance Neurological ROS: negative for - behavioral changes Dermatological ROS: negative for - nail changes   Physical Exam:   GENERAL APPEARANCE: Well-developed and No Acute Distress. COMMUNICATION: Able to Communicate. Strong Voice.   HEAD AND FACE Eyes: Testing of ocular motility including primary gaze alignment normal. Inspection and Appearance: No evidence of lesions or masses Palpation: Palpation of the face reveals no sinus tenderness Salivary Glands: Symmetric without masses Facial Strength: Symmetric without evidence of facial paralysis   EAR, NOSE, MOUTH, and THROAT: Ear Canals and Tympanic Membranes, Bilateral: No evidence of inflammation or lesions. Thresholds: Clinical speech reception thresholds normal. External, Nose and Auricle: No lesions or masses.   NECK: Evaluation: No evidence of masses or crepitus. The neck is symmetric and the trachea is in the midline position. Thyroid: No evidence of enlargement, tenderness or mass. Neck Lymph Nodes: WNL. Respiratory: Inspection of the chest including symmetry, expansion and/or assessment of respiratory effort normal. Cardiovascular: Evaluation of peripheral vascular system by observation and palpation of capillary refill, normal. Neurological/Psychiatric: Alert, Oriented, Mood, and Affect Normal.   PROCEDURE: Nasal endoscopy ([id number])   SURGEON: Matt Dale MD   Prior to the procedure, I had a discussion with the patient regarding the risks, benefits, and alternatives of the procedure and a verbal consent was obtained.   After obtaining adequate decongestion of the nasal mucosa with topical Epinephrine and adequate anesthesia with topical Lidocaine nasal spray, the nasal endoscope was used to examine the nasal passages and paranasal sinuses. The endoscope was passed along the floor of the nose bilaterally to evaluate the inferior meatus, floor of the nose, inferior turbinate, and nasopharynx. The scope was then passed superiorly to evaluate the area of the sphenoethmoidal recess, superior turbinate and superior meatus. As the scope was withdrawn anteriorly, the middle turbinate and middle meatus were carefully inspected. The endoscope was withdrawn and the patient tolerated the procedure well. No complications were encountered.   INSTRUMENTS: rigid 45   EXAM FINDINGS:  extensive debris removed, improved, but mostly diffuse in the lower sinses and the right frontal recess. all operated sinuses widely patent  IMPRESSION: Mr. Sosa is a pleasant 29 year old gentleman with frontal sinus osteoma, CRSsNP, ETD, severe ARA, BMI39 s/p intracranial fossa tumor removal, FESS and septoplasty on 8/12/2024   PLAN: - continue budesonide but titrate to 1mg BID, prescriptions refilled - RTC 6-12 weeks  Matt Dale MD [facility] Rhinology and Skull Base Surgery Department of Otolaryngology- Head and Neck Surgery [facility]

## 2024-09-23 NOTE — HISTORY OF PRESENT ILLNESS
Nursing report ED to floor  Nati Nguyen  68 y.o.  female    HPI:   Chief Complaint   Patient presents with    Shortness of Breath    Altered Mental Status       Admitting doctor:   John Denton MD    Consulting provider(s):  Consults       No orders found from 4/25/2023 to 5/25/2023.             Admitting diagnosis:   The primary encounter diagnosis was Sepsis with acute respiratory failure without septic shock, due to unspecified organism, unspecified whether hypoxia or hypercapnia present. Diagnoses of Pneumonia due to infectious organism, unspecified laterality, unspecified part of lung, Elevated troponin, and Altered mental status, unspecified altered mental status type were also pertinent to this visit.    Code status:   Current Code Status       Date Active Code Status Order ID Comments User Context       Not on file            Allergies:   Patient has no allergy information on record.    Intake and Output  No intake or output data in the 24 hours ending 05/24/23 1430    Weight:       05/24/23  1103   Weight: 60 kg (132 lb 4.4 oz)       Most recent vitals:   Vitals:    05/24/23 1131 05/24/23 1146 05/24/23 1246 05/24/23 1331   BP: (!) 114/37 124/45 129/49 121/53   BP Location:       Patient Position:       Pulse: 87 89 96 97   Resp:       Temp:       TempSrc:       SpO2: 90% 91% 91% 90%   Weight:       Height:         Oxygen Therapy: .    Active LDAs/IV Access:   Lines, Drains & Airways       Active LDAs       Name Placement date Placement time Site Days    Peripheral IV 05/24/23 1033 Left Antecubital 05/24/23  1033  Antecubital  less than 1    Peripheral IV 05/24/23 1033 Right Antecubital 05/24/23  1033  Antecubital  less than 1                    Labs (abnormal labs have a star):   Labs Reviewed   COMPREHENSIVE METABOLIC PANEL - Abnormal; Notable for the following components:       Result Value    Glucose 130 (*)     BUN 46 (*)     Sodium 131 (*)     Chloride 91 (*)     CO2 9.0 (*)     ALT (SGPT) 510  [de-identified] : CC: frontal sinus mass   HISTORY OF PRESENT ILLNESS: Mr. Sosa is a pleasant 29 year old gentleman with frontal sinus mass referred by Dr. 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 Briseno for UPPP and ETBD  6 week f/u s/p intracranial fossa tumor removal, FESS and septoplasty on 8/12/2024 on budesonide  he is doing well, sleeping and breathing better. he is irrigating 2-3x/day with budesonide  REVIEW OF SYSTEMS: General ROS: negative for - chills, fatigue or fever Psychological ROS: negative for - anxiety or depression Ophthalmic ROS: negative for - blurry vision, decreased vision or double vision ENT ROS: negative except as noted from HPI Allergy and Immunology ROS: negative except as noted from HPI Hematological and Lymphatic ROS: negative for - bleeding problems Endocrine ROS: negative for - malaise/lethargy Respiratory ROS: negative for - stridor Cardiovascular ROS: negative for - chest pain Gastrointestinal ROS: negative for - appetite loss or nausea/vomiting Genitourinary ROS: negative for - incontinence Musculoskeletal ROS: negative for - gait disturbance Neurological ROS: negative for - behavioral changes Dermatological ROS: negative for - nail changes   Physical Exam:   GENERAL APPEARANCE: Well-developed and No Acute Distress. COMMUNICATION: Able to Communicate. Strong Voice.   HEAD AND FACE Eyes: Testing of ocular motility including primary gaze alignment normal. Inspection and Appearance: No evidence of lesions or masses Palpation: Palpation of the face reveals no sinus tenderness Salivary Glands: Symmetric without masses Facial Strength: Symmetric without evidence of facial paralysis   EAR, NOSE, MOUTH, and THROAT: Ear Canals and Tympanic Membranes, Bilateral: No evidence of inflammation or lesions. Thresholds: Clinical speech reception thresholds normal. External, Nose and Auricle: No lesions or masses.   NECK: Evaluation: No evidence of masses or crepitus. The neck is symmetric and the trachea is in the midline position. Thyroid: No evidence of enlargement, tenderness or mass. Neck Lymph Nodes: WNL. Respiratory: Inspection of the chest including symmetry, expansion and/or assessment of respiratory effort normal. Cardiovascular: Evaluation of peripheral vascular system by observation and palpation of capillary refill, normal. Neurological/Psychiatric: Alert, Oriented, Mood, and Affect Normal.   PROCEDURE: Nasal endoscopy (78126)   SURGEON: Matt Dale MD   Prior to the procedure, I had a discussion with the patient regarding the risks, benefits, and alternatives of the procedure and a verbal consent was obtained.   After obtaining adequate decongestion of the nasal mucosa with topical Epinephrine and adequate anesthesia with topical Lidocaine nasal spray, the nasal endoscope was used to examine the nasal passages and paranasal sinuses. The endoscope was passed along the floor of the nose bilaterally to evaluate the inferior meatus, floor of the nose, inferior turbinate, and nasopharynx. The scope was then passed superiorly to evaluate the area of the sphenoethmoidal recess, superior turbinate and superior meatus. As the scope was withdrawn anteriorly, the middle turbinate and middle meatus were carefully inspected. The endoscope was withdrawn and the patient tolerated the procedure well. No complications were encountered.   INSTRUMENTS: rigid 45   EXAM FINDINGS:  extensive debris removed, improved, but mostly diffuse in the lower sinses and the right frontal recess. all operated sinuses widely patent  IMPRESSION: Mr. Sosa is a pleasant 29 year old gentleman with frontal sinus osteoma, CRSsNP, ETD, severe ARA, BMI39 s/p intracranial fossa tumor removal, FESS and septoplasty on 8/12/2024   PLAN: - continue budesonide but titrate to 1mg BID, prescriptions refilled - RTC 6-12 weeks  Matt Dale MD [facility] Department of Otolaryngology- Head and Neck Surgery [facility] "(*)     AST (SGOT) 1,197 (*)     Alkaline Phosphatase 239 (*)     Total Bilirubin 3.7 (*)     BUN/Creatinine Ratio 50.0 (*)     Anion Gap 31.0 (*)     All other components within normal limits    Narrative:     GFR Normal >60  Chronic Kidney Disease <60  Kidney Failure <15     BNP (IN-HOUSE) - Abnormal; Notable for the following components:    proBNP 12,071.0 (*)     All other components within normal limits    Narrative:     Among patients with dyspnea, NT-proBNP is highly sensitive for the detection of acute congestive heart failure. In addition NT-proBNP of <300 pg/ml effectively rules out acute congestive heart failure with 99% negative predictive value.    Results may be falsely decreased if patient taking Biotin.     D-DIMER, QUANTITATIVE - Abnormal; Notable for the following components:    D-Dimer, Quantitative 17.76 (*)     All other components within normal limits    Narrative:     According to the assay 's published package insert, a normal (<0.50 MCGFEU/mL) D-dimer result in conjunction with a non-high clinical probability assessment, excludes deep vein thrombosis (DVT) and pulmonary embolism (PE) with high sensitivity.    D-dimer values increase with age and this can make VTE exclusion of an older population difficult. To address this, the [employer], based on best available evidence and recent guidelines, recommends that clinicians use age-adjusted D-dimer thresholds in patients greater than 50 years of age with: a) a low probability of PE who do not meet all Pulmonary Embolism Rule Out Criteria, or b) in those with intermediate probability of PE.   The formula for an age-adjusted D-dimer cut-off is \"age/100\".  For example, a 60 year old patient would have an age-adjusted cut-off of 0.60 MCGFEU/mL and an 80 year old 0.80 MCGFEU/mL.   TROPONIN - Abnormal; Notable for the following components:    HS Troponin T 81 (*)     All other components within normal limits    " "Narrative:     High Sensitive Troponin T Reference Range:  <10.0 ng/L- Negative Female for AMI  <15.0 ng/L- Negative Male for AMI  >=10 - Abnormal Female indicating possible myocardial injury.  >=15 - Abnormal Male indicating possible myocardial injury.   Clinicians would have to utilize clinical acumen, EKG, Troponin, and serial changes to determine if it is an Acute Myocardial Infarction or myocardial injury due to an underlying chronic condition.        LACTIC ACID, PLASMA - Abnormal; Notable for the following components:    Lactate 17.0 (*)     All other components within normal limits   PROCALCITONIN - Abnormal; Notable for the following components:    Procalcitonin 0.47 (*)     All other components within normal limits    Narrative:     As a Marker for Sepsis (Non-Neonates):    1. <0.5 ng/mL represents a low risk of severe sepsis and/or septic shock.  2. >2 ng/mL represents a high risk of severe sepsis and/or septic shock.    As a Marker for Lower Respiratory Tract Infections that require antibiotic therapy:    PCT on Admission    Antibiotic Therapy       6-12 Hrs later    >0.5                Strongly Recommended  >0.25 - <0.5        Recommended   0.1 - 0.25          Discouraged              Remeasure/reassess PCT  <0.1                Strongly Discouraged     Remeasure/reassess PCT    As 28 day mortality risk marker: \"Change in Procalcitonin Result\" (>80% or <=80%) if Day 0 (or Day 1) and Day 4 values are available. Refer to http://www.Amplitudes-pct-calculator.com    Change in PCT <=80%  A decrease of PCT levels below or equal to 80% defines a positive change in PCT test result representing a higher risk for 28-day all-cause mortality of patients diagnosed with severe sepsis for septic shock.    Change in PCT >80%  A decrease of PCT levels of more than 80% defines a negative change in PCT result representing a lower risk for 28-day all-cause mortality of patients diagnosed with severe sepsis or septic shock.    "   CK - Abnormal; Notable for the following components:    Creatine Kinase 10,760 (*)     All other components within normal limits   URINALYSIS W/ CULTURE IF INDICATED - Abnormal; Notable for the following components:    Color, UA Dark Yellow (*)     Ketones, UA Trace (*)     Bilirubin, UA Small (1+) (*)     Blood, UA Moderate (2+) (*)     Protein, UA 30 mg/dL (1+) (*)     Urobilinogen, UA 2.0 E.U./dL (*)     All other components within normal limits    Narrative:     In absence of clinical symptoms, the presence of pyuria, bacteria, and/or nitrites on the urinalysis result does not correlate with infection.   CBC WITH AUTO DIFFERENTIAL - Abnormal; Notable for the following components:    WBC 28.60 (*)     Hemoglobin 8.7 (*)     Hematocrit 32.7 (*)     MCH 21.8 (*)     MCHC 26.6 (*)     RDW 21.3 (*)     RDW-SD 60.2 (*)     MPV 12.2 (*)     Neutrophil % 91.2 (*)     Lymphocyte % 3.4 (*)     Monocyte % 4.3 (*)     Eosinophil % 0.0 (*)     Neutrophils, Absolute 26.06 (*)     Monocytes, Absolute 1.23 (*)     All other components within normal limits   HIGH SENSITIVITIY TROPONIN T 2HR - Abnormal; Notable for the following components:    HS Troponin T 84 (*)     All other components within normal limits    Narrative:     High Sensitive Troponin T Reference Range:  <10.0 ng/L- Negative Female for AMI  <15.0 ng/L- Negative Male for AMI  >=10 - Abnormal Female indicating possible myocardial injury.  >=15 - Abnormal Male indicating possible myocardial injury.   Clinicians would have to utilize clinical acumen, EKG, Troponin, and serial changes to determine if it is an Acute Myocardial Infarction or myocardial injury due to an underlying chronic condition.        BLOOD GAS, ARTERIAL - Abnormal; Notable for the following components:    pH, Arterial 7.152 (*)     pCO2, Arterial 28.0 (*)     pO2, Arterial 72.2 (*)     HCO3, Arterial 9.8 (*)     Base Excess, Arterial -17.5 (*)     O2 Saturation, Arterial 90.8 (*)     pCO2,  Temperature Corrected 28.0 (*)     pH, Temp Corrected 7.152 (*)     pO2, Temperature Corrected 72.2 (*)     All other components within normal limits   URINALYSIS, MICROSCOPIC ONLY - Abnormal; Notable for the following components:    RBC, UA 0-2 (*)     WBC, UA 0-2 (*)     All other components within normal limits   LACTIC ACID, REFLEX - Abnormal; Notable for the following components:    Lactate 12.1 (*)     All other components within normal limits   MAGNESIUM - Normal   BLOOD CULTURE   BLOOD CULTURE   BLOOD GAS, ARTERIAL   RAINBOW DRAW    Narrative:     The following orders were created for panel order [place] Draw.  Procedure                               Abnormality         Status                     ---------                               -----------         ------                     Green Top (Gel)[[id number]]                                  Final result               Lavender Top[[id number]]                                     Final result               Red Top[[id number]]                                          Final result               Shi Top[[id number]]                                         In process                 Light Blue Top[[id number]]                                   Final result                 Please view results for these tests on the individual orders.   LACTIC ACID, REFLEX   GREEN TOP   LAVENDER TOP   RED TOP   LIGHT BLUE TOP   CBC AND DIFFERENTIAL    Narrative:     The following orders were created for panel order CBC & Differential.  Procedure                               Abnormality         Status                     ---------                               -----------         ------                     CBC Auto Differential[[id number]]        Abnormal            Final result                 Please view results for these tests on the individual orders.   GRAY TOP       Meds given in ED:   Medications   sodium chloride 0.9 % flush 10 mL (has no administration in time range)   sodium  chloride 0.9 % bolus 1,000 mL (0 mL Intravenous Stopped 5/24/23 1121)   piperacillin-tazobactam (ZOSYN) IVPB 4.5 g in 100 mL NS (CD) (0 g Intravenous Stopped 5/24/23 1330)   iopamidol (ISOVUE-370) 76 % injection 100 mL (100 mL Intravenous Given 5/24/23 1320)           NIH Stroke Scale:       Isolation/Infection(s):  No active isolations   No active infections     COVID Testing  Collected .  Resulted .    Nursing report ED to floor:  Mental status: .  Ambulatory status: .  Precautions: .    ED nurse phone extentsion- ..

## 2024-09-23 NOTE — HISTORY OF PRESENT ILLNESS
[de-identified] : CC: frontal sinus mass   HISTORY OF PRESENT ILLNESS: Mr. Sosa is a pleasant 29 year old gentleman with frontal sinus mass referred by Dr. 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 Briseno for UPPP and ETBD  6 week f/u s/p intracranial fossa tumor removal, FESS and septoplasty on 8/12/2024 on budesonide  he is doing well, sleeping and breathing better. he is irrigating 2-3x/day with budesonide  REVIEW OF SYSTEMS: General ROS: negative for - chills, fatigue or fever Psychological ROS: negative for - anxiety or depression Ophthalmic ROS: negative for - blurry vision, decreased vision or double vision ENT ROS: negative except as noted from HPI Allergy and Immunology ROS: negative except as noted from HPI Hematological and Lymphatic ROS: negative for - bleeding problems Endocrine ROS: negative for - malaise/lethargy Respiratory ROS: negative for - stridor Cardiovascular ROS: negative for - chest pain Gastrointestinal ROS: negative for - appetite loss or nausea/vomiting Genitourinary ROS: negative for - incontinence Musculoskeletal ROS: negative for - gait disturbance Neurological ROS: negative for - behavioral changes Dermatological ROS: negative for - nail changes   Physical Exam:   GENERAL APPEARANCE: Well-developed and No Acute Distress. COMMUNICATION: Able to Communicate. Strong Voice.   HEAD AND FACE Eyes: Testing of ocular motility including primary gaze alignment normal. Inspection and Appearance: No evidence of lesions or masses Palpation: Palpation of the face reveals no sinus tenderness Salivary Glands: Symmetric without masses Facial Strength: Symmetric without evidence of facial paralysis   EAR, NOSE, MOUTH, and THROAT: Ear Canals and Tympanic Membranes, Bilateral: No evidence of inflammation or lesions. Thresholds: Clinical speech reception thresholds normal. External, Nose and Auricle: No lesions or masses.   NECK: Evaluation: No evidence of masses or crepitus. The neck is symmetric and the trachea is in the midline position. Thyroid: No evidence of enlargement, tenderness or mass. Neck Lymph Nodes: WNL. Respiratory: Inspection of the chest including symmetry, expansion and/or assessment of respiratory effort normal. Cardiovascular: Evaluation of peripheral vascular system by observation and palpation of capillary refill, normal. Neurological/Psychiatric: Alert, Oriented, Mood, and Affect Normal.   PROCEDURE: Nasal endoscopy (06935)   SURGEON: Matt Dale MD   Prior to the procedure, I had a discussion with the patient regarding the risks, benefits, and alternatives of the procedure and a verbal consent was obtained.   After obtaining adequate decongestion of the nasal mucosa with topical Epinephrine and adequate anesthesia with topical Lidocaine nasal spray, the nasal endoscope was used to examine the nasal passages and paranasal sinuses. The endoscope was passed along the floor of the nose bilaterally to evaluate the inferior meatus, floor of the nose, inferior turbinate, and nasopharynx. The scope was then passed superiorly to evaluate the area of the sphenoethmoidal recess, superior turbinate and superior meatus. As the scope was withdrawn anteriorly, the middle turbinate and middle meatus were carefully inspected. The endoscope was withdrawn and the patient tolerated the procedure well. No complications were encountered.   INSTRUMENTS: rigid 45   EXAM FINDINGS:  extensive debris removed, improved, but mostly diffuse in the lower sinses and the right frontal recess. all operated sinuses widely patent  IMPRESSION: Mr. Sosa is a pleasant 29 year old gentleman with frontal sinus osteoma, CRSsNP, ETD, severe ARA, BMI39 s/p intracranial fossa tumor removal, FESS and septoplasty on 8/12/2024   PLAN: - continue budesonide but titrate to 1mg BID, prescriptions refilled - RTC 6-12 weeks  Matt Dale MD [facility] Department of Otolaryngology- Head and Neck Surgery [facility]

## 2024-09-24 ENCOUNTER — APPOINTMENT (OUTPATIENT)
Dept: PULMONOLOGY | Facility: CLINIC | Age: 29
End: 2024-09-24
Payer: COMMERCIAL

## 2024-09-24 VITALS
HEIGHT: 70 IN | TEMPERATURE: 98.1 F | SYSTOLIC BLOOD PRESSURE: 139 MMHG | WEIGHT: 275 LBS | OXYGEN SATURATION: 97 % | DIASTOLIC BLOOD PRESSURE: 94 MMHG | BODY MASS INDEX: 39.37 KG/M2 | HEART RATE: 102 BPM

## 2024-09-24 DIAGNOSIS — I48.0 PAROXYSMAL ATRIAL FIBRILLATION: ICD-10-CM

## 2024-09-24 DIAGNOSIS — E66.01 MORBID (SEVERE) OBESITY DUE TO EXCESS CALORIES: ICD-10-CM

## 2024-09-24 PROCEDURE — G2211 COMPLEX E/M VISIT ADD ON: CPT | Mod: NC

## 2024-09-24 PROCEDURE — 99214 OFFICE O/P EST MOD 30 MIN: CPT

## 2024-09-24 NOTE — ASSESSMENT
[FreeTextEntry1] : 29 year old presenting for evaluation of ARA  Data reviewed: Sleep study 6/2024- Severe ARA with afib noted. AHI 57.7 ENT notes reviewed  Severe ARA Atrial fibrillation Obesity  Patient tolerating PAP therapy well but is having some issues with the under the nose mask. Will reach out to sleep center to try and arrange a mask fitting as over the nose may be a better fit for him. Otherwise will continue use and will slowly continue to see improvement with continued use  - Severe ARA on PAP therapy - Will arrange mask fitting with sleep center as over the nose mask may be better fit   RTC in 6 months

## 2024-09-24 NOTE — HISTORY OF PRESENT ILLNESS
[Never] : never [TextBox_4] : 29 year old presenting for evaluation of ARA. Patient states that he is a never smoker and over the last few years he has noticed that he is very tired all the time. He has no family hx of ARA that he knows of. He can easily fall asleep after work and feels that he has no energy. He does snore and his wife has noted that at times he seems like he stops breathing in his sleep. He does not get up frequently in his sleep to urinate. He denies difficulty focusing or falling asleep during the daytime.  9/24/2024 Patient received the machine and has noticed that he does feel better with use. Having issues with the mask fitting as he sleeps on his side and it falls off during the night. [Obstructive Sleep Apnea] : obstructive sleep apnea [ESS] : 13

## 2024-09-27 ENCOUNTER — APPOINTMENT (OUTPATIENT)
Dept: SURGERY | Facility: CLINIC | Age: 29
End: 2024-09-27

## 2024-09-27 VITALS
SYSTOLIC BLOOD PRESSURE: 153 MMHG | TEMPERATURE: 97.3 F | WEIGHT: 271 LBS | DIASTOLIC BLOOD PRESSURE: 97 MMHG | BODY MASS INDEX: 38.8 KG/M2 | OXYGEN SATURATION: 98 % | HEART RATE: 85 BPM | HEIGHT: 70 IN

## 2024-09-27 NOTE — HISTORY OF PRESENT ILLNESS
[de-identified] : This is a 30 y/o male presenting to the office for evaluation and management of a

## 2024-10-03 ENCOUNTER — APPOINTMENT (OUTPATIENT)
Dept: OTOLARYNGOLOGY | Facility: CLINIC | Age: 29
End: 2024-10-03
Payer: COMMERCIAL

## 2024-10-03 VITALS
WEIGHT: 271 LBS | HEIGHT: 70 IN | BODY MASS INDEX: 38.8 KG/M2 | OXYGEN SATURATION: 99 % | DIASTOLIC BLOOD PRESSURE: 87 MMHG | HEART RATE: 73 BPM | TEMPERATURE: 98 F | SYSTOLIC BLOOD PRESSURE: 136 MMHG

## 2024-10-03 DIAGNOSIS — D16.9 BENIGN NEOPLASM OF BONE AND ARTICULAR CARTILAGE, UNSPECIFIED: ICD-10-CM

## 2024-10-03 DIAGNOSIS — J33.9 NASAL POLYP, UNSPECIFIED: ICD-10-CM

## 2024-10-03 DIAGNOSIS — G47.33 OBSTRUCTIVE SLEEP APNEA (ADULT) (PEDIATRIC): ICD-10-CM

## 2024-10-03 DIAGNOSIS — Z87.09 PERSONAL HISTORY OF OTHER DISEASES OF THE RESPIRATORY SYSTEM: ICD-10-CM

## 2024-10-03 DIAGNOSIS — H65.93 UNSPECIFIED NONSUPPURATIVE OTITIS MEDIA, BILATERAL: ICD-10-CM

## 2024-10-03 PROCEDURE — 99214 OFFICE O/P EST MOD 30 MIN: CPT | Mod: 25,24

## 2024-10-03 PROCEDURE — 92504 EAR MICROSCOPY EXAMINATION: CPT

## 2024-10-03 RX ORDER — MOMETASONE FUROATE 100 %
POWDER (GRAM) MISCELLANEOUS
Qty: 0.18 | Refills: 1 | Status: ACTIVE | COMMUNITY
Start: 2024-10-03 | End: 1900-01-01

## 2024-10-03 NOTE — ASSESSMENT
[FreeTextEntry1] : I recommended that we table discussion of ballooning his eustachian tubes until we decrease the inflammation in his nose and confirmed w/ Dr. Dale that we can switch him to mometasone which may be easier to get approved by his insurance. I did offer a BMT today and he declines.   We also discussed his large tonsils and possible eventual tonsillectomy, but he's tolerating CPAP well for the moment.

## 2024-10-03 NOTE — DATA REVIEWED
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

## 2024-10-03 NOTE — PROCEDURE
[FreeTextEntry6] : Indication: requirement for postoperative debridement After verbal consent and the administration of an aerosolized oxymetazoline/lidocaine mix, examination and debridement was performed with a rigid 30 deg endoscope septum intact & midline, healing well middle turbs maintaining medialized position debridement performed bilaterally using suction and forceps as required; bilateral grade 2-3 polyposis in the ethmoid beds with mucopurulence which was debrided

## 2024-10-03 NOTE — HISTORY OF PRESENT ILLNESS
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 Subsequent hearing loss generally passes with extended decongestant use. Now he returns with worsened hearing especially on the R; he's not sure if his irrigations have somethign to do with this. At baseline he doesn't notice hearing loss or tinnitus. No hx tubes or an audio. He'd like to proceed w/ balloon ET dilation when possible.  He completed sinus surgery for the large frontal osteoma with Dr. Dale 8/19/24 and has been having discolored drainage; he's irrigating but his insurance denied his budesonide refill.  Periodic reflux  Snoring, witnessed apneas, daytime somnolence and HTN- this is improved somewhat since his surgery which included a septoplasty. He had an HSAT showing severe ARA and is now adjusting to CPAP.

## 2024-10-03 NOTE — PHYSICAL EXAM
[de-identified] : thick [Nasal Endoscopy Performed] : nasal endoscopy was performed, see procedure section for findings [de-identified] : mFTP2a [de-identified] : 3+ [Binocular Microscopic Exam] : Binocular microscopic exam was performed [Normal] : the left tympanic membrane was normal [de-identified] : fluid [de-identified] : fluid w/ bubbles

## 2024-10-21 ENCOUNTER — APPOINTMENT (OUTPATIENT)
Dept: OTOLARYNGOLOGY | Facility: CLINIC | Age: 29
End: 2024-10-21
Payer: COMMERCIAL

## 2024-10-21 VITALS — BODY MASS INDEX: 38.65 KG/M2 | WEIGHT: 270 LBS | HEIGHT: 70 IN

## 2024-10-21 DIAGNOSIS — J32.2 CHRONIC ETHMOIDAL SINUSITIS: ICD-10-CM

## 2024-10-21 DIAGNOSIS — G47.33 OBSTRUCTIVE SLEEP APNEA (ADULT) (PEDIATRIC): ICD-10-CM

## 2024-10-21 DIAGNOSIS — D49.1 NEOPLASM OF UNSPECIFIED BEHAVIOR OF RESPIRATORY SYSTEM: ICD-10-CM

## 2024-10-21 DIAGNOSIS — E66.811 OBESITY, CLASS 1: ICD-10-CM

## 2024-10-21 DIAGNOSIS — J32.3 CHRONIC SPHENOIDAL SINUSITIS: ICD-10-CM

## 2024-10-21 DIAGNOSIS — H65.93 UNSPECIFIED NONSUPPURATIVE OTITIS MEDIA, BILATERAL: ICD-10-CM

## 2024-10-21 DIAGNOSIS — D16.9 BENIGN NEOPLASM OF BONE AND ARTICULAR CARTILAGE, UNSPECIFIED: ICD-10-CM

## 2024-10-21 DIAGNOSIS — Z96.22 MYRINGOTOMY TUBE(S) STATUS: ICD-10-CM

## 2024-10-21 DIAGNOSIS — J32.0 CHRONIC MAXILLARY SINUSITIS: ICD-10-CM

## 2024-10-21 DIAGNOSIS — H69.93 UNSPECIFIED EUSTACHIAN TUBE DISORDER, BILATERAL: ICD-10-CM

## 2024-10-21 DIAGNOSIS — J33.9 NASAL POLYP, UNSPECIFIED: ICD-10-CM

## 2024-10-21 DIAGNOSIS — H90.0 CONDUCTIVE HEARING LOSS, BILATERAL: ICD-10-CM

## 2024-10-21 DIAGNOSIS — J32.1 CHRONIC FRONTAL SINUSITIS: ICD-10-CM

## 2024-10-21 PROCEDURE — 69420 INCISION OF EARDRUM: CPT | Mod: 50,79

## 2024-10-21 PROCEDURE — 92567 TYMPANOMETRY: CPT

## 2024-10-21 PROCEDURE — 99214 OFFICE O/P EST MOD 30 MIN: CPT | Mod: 25

## 2024-10-21 PROCEDURE — 92557 COMPREHENSIVE HEARING TEST: CPT

## 2024-10-21 RX ORDER — MOMETASONE FUROATE 100 %
POWDER (GRAM) MISCELLANEOUS
Qty: 360 | Refills: 0 | Status: ACTIVE | COMMUNITY
Start: 2024-10-21 | End: 1900-01-01

## 2024-10-21 RX ORDER — MUPIROCIN 100 %
POWDER (GRAM) MISCELLANEOUS
Qty: 18 | Refills: 0 | Status: ACTIVE | COMMUNITY
Start: 2024-10-21 | End: 1900-01-01

## 2024-10-22 ENCOUNTER — APPOINTMENT (OUTPATIENT)
Dept: OTOLARYNGOLOGY | Facility: CLINIC | Age: 29
End: 2024-10-22

## 2024-10-24 RX ORDER — CIPROFLOXACIN HYDROCHLORIDE 500 MG/1
500 TABLET, FILM COATED ORAL DAILY
Qty: 10 | Refills: 0 | Status: ACTIVE | COMMUNITY
Start: 2024-10-23 | End: 1900-01-01

## 2024-10-24 NOTE — PRE-OP CHECKLIST - ANTIBIOTIC
n/a
DO Abdulaziz, EM Attending: Patient found to have colitis, will discharge on Cipro, sent to his pharmacy along with pain medications.  Discussed with patient return precautions.  Recommended follow-up with his primary care doctor in 2 to 3 days.  Patient amenable with DC at this time.

## 2024-10-25 ENCOUNTER — APPOINTMENT (OUTPATIENT)
Dept: HEART AND VASCULAR | Facility: CLINIC | Age: 29
End: 2024-10-25

## 2024-10-28 ENCOUNTER — NON-APPOINTMENT (OUTPATIENT)
Age: 29
End: 2024-10-28

## 2024-10-28 LAB — EAR NOSE AND THROAT CULTURE: ABNORMAL

## 2024-11-01 ENCOUNTER — APPOINTMENT (OUTPATIENT)
Dept: HEART AND VASCULAR | Facility: CLINIC | Age: 29
End: 2024-11-01

## 2024-11-01 DIAGNOSIS — R94.31 ABNORMAL ELECTROCARDIOGRAM [ECG] [EKG]: ICD-10-CM

## 2024-11-01 DIAGNOSIS — I15.9 SECONDARY HYPERTENSION, UNSPECIFIED: ICD-10-CM

## 2024-11-01 DIAGNOSIS — I48.0 PAROXYSMAL ATRIAL FIBRILLATION: ICD-10-CM

## 2024-11-07 ENCOUNTER — RX RENEWAL (OUTPATIENT)
Age: 29
End: 2024-11-07

## 2024-11-07 RX ORDER — ADHESIVE TAPE 3"X 2.3 YD
50 MCG TAPE, NON-MEDICATED TOPICAL
Qty: 90 | Refills: 0 | Status: ACTIVE | COMMUNITY
Start: 2024-11-07 | End: 1900-01-01

## 2024-11-18 ENCOUNTER — APPOINTMENT (OUTPATIENT)
Dept: OTOLARYNGOLOGY | Facility: CLINIC | Age: 29
End: 2024-11-18

## 2024-11-22 ENCOUNTER — APPOINTMENT (OUTPATIENT)
Dept: SURGERY | Facility: CLINIC | Age: 29
End: 2024-11-22
Payer: COMMERCIAL

## 2024-11-22 VITALS
SYSTOLIC BLOOD PRESSURE: 158 MMHG | TEMPERATURE: 97.6 F | HEART RATE: 106 BPM | BODY MASS INDEX: 38.65 KG/M2 | DIASTOLIC BLOOD PRESSURE: 106 MMHG | HEIGHT: 70 IN | WEIGHT: 270 LBS | OXYGEN SATURATION: 97 %

## 2024-11-22 DIAGNOSIS — R22.2 LOCALIZED SWELLING, MASS AND LUMP, TRUNK: ICD-10-CM

## 2024-11-22 DIAGNOSIS — G47.30 SLEEP APNEA, UNSPECIFIED: ICD-10-CM

## 2024-11-22 DIAGNOSIS — D17.9 BENIGN LIPOMATOUS NEOPLASM, UNSPECIFIED: ICD-10-CM

## 2024-11-22 DIAGNOSIS — R73.03 PREDIABETES.: ICD-10-CM

## 2024-11-22 PROCEDURE — 99203 OFFICE O/P NEW LOW 30 MIN: CPT

## 2024-11-25 PROBLEM — R22.2 MASS OF BUTTOCK: Status: ACTIVE | Noted: 2024-11-25

## 2024-11-26 ENCOUNTER — APPOINTMENT (OUTPATIENT)
Dept: OTOLARYNGOLOGY | Facility: CLINIC | Age: 29
End: 2024-11-26
Payer: COMMERCIAL

## 2024-11-26 VITALS
TEMPERATURE: 98.2 F | DIASTOLIC BLOOD PRESSURE: 102 MMHG | OXYGEN SATURATION: 98 % | WEIGHT: 270 LBS | BODY MASS INDEX: 38.65 KG/M2 | SYSTOLIC BLOOD PRESSURE: 148 MMHG | HEART RATE: 88 BPM | HEIGHT: 70 IN

## 2024-11-26 DIAGNOSIS — H69.93 UNSPECIFIED EUSTACHIAN TUBE DISORDER, BILATERAL: ICD-10-CM

## 2024-11-26 DIAGNOSIS — J33.9 NASAL POLYP, UNSPECIFIED: ICD-10-CM

## 2024-11-26 DIAGNOSIS — H90.0 CONDUCTIVE HEARING LOSS, BILATERAL: ICD-10-CM

## 2024-11-26 DIAGNOSIS — G47.33 OBSTRUCTIVE SLEEP APNEA (ADULT) (PEDIATRIC): ICD-10-CM

## 2024-11-26 PROCEDURE — 69200 CLEAR OUTER EAR CANAL: CPT | Mod: RT

## 2024-11-26 PROCEDURE — 99214 OFFICE O/P EST MOD 30 MIN: CPT | Mod: 25

## 2024-11-26 PROCEDURE — 31237 NSL/SINS NDSC SURG BX POLYPC: CPT | Mod: 50

## 2024-11-29 ENCOUNTER — TRANSCRIPTION ENCOUNTER (OUTPATIENT)
Age: 29
End: 2024-11-29

## 2024-12-02 ENCOUNTER — APPOINTMENT (OUTPATIENT)
Dept: OTOLARYNGOLOGY | Facility: CLINIC | Age: 29
End: 2024-12-02

## 2024-12-05 ENCOUNTER — APPOINTMENT (OUTPATIENT)
Dept: FAMILY MEDICINE | Facility: CLINIC | Age: 29
End: 2024-12-05

## 2024-12-17 ENCOUNTER — APPOINTMENT (OUTPATIENT)
Dept: OTOLARYNGOLOGY | Facility: CLINIC | Age: 29
End: 2024-12-17
Payer: COMMERCIAL

## 2024-12-17 VITALS
TEMPERATURE: 97.5 F | OXYGEN SATURATION: 97 % | HEIGHT: 70 IN | DIASTOLIC BLOOD PRESSURE: 88 MMHG | SYSTOLIC BLOOD PRESSURE: 147 MMHG | HEART RATE: 99 BPM | WEIGHT: 270 LBS | BODY MASS INDEX: 38.65 KG/M2

## 2024-12-17 DIAGNOSIS — K21.9 GASTRO-ESOPHAGEAL REFLUX DISEASE W/OUT ESOPHAGITIS: ICD-10-CM

## 2024-12-17 PROCEDURE — G2211 COMPLEX E/M VISIT ADD ON: CPT | Mod: NC

## 2024-12-17 PROCEDURE — 99214 OFFICE O/P EST MOD 30 MIN: CPT

## 2024-12-17 RX ORDER — DIAZEPAM 5 MG/1
5 TABLET ORAL
Qty: 1 | Refills: 0 | Status: ACTIVE | COMMUNITY
Start: 2024-12-17 | End: 1900-01-01

## 2024-12-17 RX ORDER — OFLOXACIN OTIC 3 MG/ML
0.3 SOLUTION AURICULAR (OTIC)
Qty: 1 | Refills: 0 | Status: ACTIVE | COMMUNITY
Start: 2024-12-17 | End: 1900-01-01

## 2024-12-17 RX ORDER — HYDROCODONE BITARTRATE AND ACETAMINOPHEN 5; 325 MG/1; MG/1
5-325 TABLET ORAL
Qty: 2 | Refills: 0 | Status: ACTIVE | COMMUNITY
Start: 2024-12-17 | End: 1900-01-01

## 2024-12-20 ENCOUNTER — APPOINTMENT (OUTPATIENT)
Dept: OTOLARYNGOLOGY | Facility: CLINIC | Age: 29
End: 2024-12-20
Payer: COMMERCIAL

## 2024-12-20 VITALS
DIASTOLIC BLOOD PRESSURE: 87 MMHG | HEART RATE: 71 BPM | TEMPERATURE: 98.3 F | OXYGEN SATURATION: 96 % | HEIGHT: 70 IN | BODY MASS INDEX: 38.65 KG/M2 | WEIGHT: 270 LBS | SYSTOLIC BLOOD PRESSURE: 134 MMHG

## 2024-12-20 DIAGNOSIS — H65.23 CHRONIC SEROUS OTITIS MEDIA, BILATERAL: ICD-10-CM

## 2024-12-20 DIAGNOSIS — H69.93 UNSPECIFIED EUSTACHIAN TUBE DISORDER, BILATERAL: ICD-10-CM

## 2024-12-20 DIAGNOSIS — H66.002 ACUTE SUPPURATIVE OTITIS MEDIA W/OUT SPONTANEOUS RUPTURE OF EAR DRUM, LEFT EAR: ICD-10-CM

## 2024-12-20 PROCEDURE — 69706Z: CUSTOM

## 2025-01-21 ENCOUNTER — NON-APPOINTMENT (OUTPATIENT)
Age: 30
End: 2025-01-21

## 2025-01-21 ENCOUNTER — APPOINTMENT (OUTPATIENT)
Dept: OTOLARYNGOLOGY | Facility: CLINIC | Age: 30
End: 2025-01-21
Payer: COMMERCIAL

## 2025-01-21 VITALS
WEIGHT: 270 LBS | HEIGHT: 70 IN | OXYGEN SATURATION: 100 % | SYSTOLIC BLOOD PRESSURE: 143 MMHG | HEART RATE: 89 BPM | BODY MASS INDEX: 38.65 KG/M2 | DIASTOLIC BLOOD PRESSURE: 89 MMHG | TEMPERATURE: 97.9 F

## 2025-01-21 DIAGNOSIS — H65.23 CHRONIC SEROUS OTITIS MEDIA, BILATERAL: ICD-10-CM

## 2025-01-21 DIAGNOSIS — J33.9 NASAL POLYP, UNSPECIFIED: ICD-10-CM

## 2025-01-21 DIAGNOSIS — G47.33 OBSTRUCTIVE SLEEP APNEA (ADULT) (PEDIATRIC): ICD-10-CM

## 2025-01-21 PROCEDURE — 99214 OFFICE O/P EST MOD 30 MIN: CPT | Mod: 25

## 2025-01-21 PROCEDURE — 31231 NASAL ENDOSCOPY DX: CPT

## 2025-01-21 PROCEDURE — 92557 COMPREHENSIVE HEARING TEST: CPT

## 2025-01-21 PROCEDURE — 92504 EAR MICROSCOPY EXAMINATION: CPT

## 2025-01-21 PROCEDURE — 92550 TYMPANOMETRY & REFLEX THRESH: CPT | Mod: 52

## 2025-01-21 RX ORDER — MOMETASONE FUROATE 100 %
POWDER (GRAM) MISCELLANEOUS
Qty: 0.09 | Refills: 1 | Status: ACTIVE | COMMUNITY
Start: 2025-01-21 | End: 1900-01-01

## 2025-02-05 ENCOUNTER — APPOINTMENT (OUTPATIENT)
Dept: SURGERY | Facility: CLINIC | Age: 30
End: 2025-02-05

## 2025-02-05 VITALS
HEART RATE: 94 BPM | OXYGEN SATURATION: 98 % | DIASTOLIC BLOOD PRESSURE: 84 MMHG | BODY MASS INDEX: 37.65 KG/M2 | TEMPERATURE: 97.7 F | HEIGHT: 70 IN | WEIGHT: 263 LBS | SYSTOLIC BLOOD PRESSURE: 158 MMHG

## 2025-02-05 DIAGNOSIS — D17.9 BENIGN LIPOMATOUS NEOPLASM, UNSPECIFIED: ICD-10-CM

## 2025-02-05 DIAGNOSIS — R73.03 PREDIABETES.: ICD-10-CM

## 2025-02-05 DIAGNOSIS — G47.30 SLEEP APNEA, UNSPECIFIED: ICD-10-CM

## 2025-02-05 PROCEDURE — 99214 OFFICE O/P EST MOD 30 MIN: CPT

## 2025-02-07 ENCOUNTER — APPOINTMENT (OUTPATIENT)
Dept: OTOLARYNGOLOGY | Facility: CLINIC | Age: 30
End: 2025-02-07
Payer: COMMERCIAL

## 2025-02-07 VITALS
HEART RATE: 85 BPM | BODY MASS INDEX: 37.65 KG/M2 | DIASTOLIC BLOOD PRESSURE: 95 MMHG | SYSTOLIC BLOOD PRESSURE: 148 MMHG | HEIGHT: 70 IN | WEIGHT: 263 LBS | TEMPERATURE: 98 F | OXYGEN SATURATION: 98 %

## 2025-02-07 DIAGNOSIS — H66.90 OTITIS MEDIA, UNSPECIFIED, UNSPECIFIED EAR: ICD-10-CM

## 2025-02-07 DIAGNOSIS — H66.002 ACUTE SUPPURATIVE OTITIS MEDIA W/OUT SPONTANEOUS RUPTURE OF EAR DRUM, LEFT EAR: ICD-10-CM

## 2025-02-07 DIAGNOSIS — J33.9 NASAL POLYP, UNSPECIFIED: ICD-10-CM

## 2025-02-07 DIAGNOSIS — G47.33 OBSTRUCTIVE SLEEP APNEA (ADULT) (PEDIATRIC): ICD-10-CM

## 2025-02-07 PROCEDURE — 31231 NASAL ENDOSCOPY DX: CPT

## 2025-02-07 PROCEDURE — 92504 EAR MICROSCOPY EXAMINATION: CPT

## 2025-02-07 PROCEDURE — 99214 OFFICE O/P EST MOD 30 MIN: CPT | Mod: 25

## 2025-02-13 ENCOUNTER — TRANSCRIPTION ENCOUNTER (OUTPATIENT)
Age: 30
End: 2025-02-13

## 2025-03-11 ENCOUNTER — NON-APPOINTMENT (OUTPATIENT)
Age: 30
End: 2025-03-11

## 2025-03-11 ENCOUNTER — APPOINTMENT (OUTPATIENT)
Dept: FAMILY MEDICINE | Facility: CLINIC | Age: 30
End: 2025-03-11
Payer: COMMERCIAL

## 2025-03-11 ENCOUNTER — LABORATORY RESULT (OUTPATIENT)
Age: 30
End: 2025-03-11

## 2025-03-11 ENCOUNTER — OUTPATIENT (OUTPATIENT)
Dept: OUTPATIENT SERVICES | Facility: HOSPITAL | Age: 30
LOS: 1 days | End: 2025-03-11

## 2025-03-11 ENCOUNTER — APPOINTMENT (OUTPATIENT)
Dept: RADIOLOGY | Facility: CLINIC | Age: 30
End: 2025-03-11
Payer: COMMERCIAL

## 2025-03-11 VITALS
SYSTOLIC BLOOD PRESSURE: 145 MMHG | BODY MASS INDEX: 37.83 KG/M2 | HEIGHT: 70 IN | HEART RATE: 89 BPM | DIASTOLIC BLOOD PRESSURE: 96 MMHG | OXYGEN SATURATION: 98 % | WEIGHT: 264.25 LBS | TEMPERATURE: 98.3 F

## 2025-03-11 VITALS — DIASTOLIC BLOOD PRESSURE: 96 MMHG | SYSTOLIC BLOOD PRESSURE: 146 MMHG

## 2025-03-11 DIAGNOSIS — Z01.818 ENCOUNTER FOR OTHER PREPROCEDURAL EXAMINATION: ICD-10-CM

## 2025-03-11 DIAGNOSIS — E11.9 TYPE 2 DIABETES MELLITUS W/OUT COMPLICATIONS: ICD-10-CM

## 2025-03-11 DIAGNOSIS — G47.33 OBSTRUCTIVE SLEEP APNEA (ADULT) (PEDIATRIC): ICD-10-CM

## 2025-03-11 DIAGNOSIS — R03.0 ELEVATED BLOOD-PRESSURE READING, W/OUT DIAGNOSIS OF HYPERTENSION: ICD-10-CM

## 2025-03-11 PROCEDURE — 71045 X-RAY EXAM CHEST 1 VIEW: CPT | Mod: 26

## 2025-03-11 PROCEDURE — G2211 COMPLEX E/M VISIT ADD ON: CPT | Mod: NC

## 2025-03-11 PROCEDURE — 99214 OFFICE O/P EST MOD 30 MIN: CPT

## 2025-03-11 PROCEDURE — 36415 COLL VENOUS BLD VENIPUNCTURE: CPT

## 2025-03-11 PROCEDURE — 93000 ELECTROCARDIOGRAM COMPLETE: CPT

## 2025-03-11 RX ORDER — BLOOD PRESSURE TEST KIT
KIT MISCELLANEOUS
Qty: 1 | Refills: 0 | Status: ACTIVE | COMMUNITY
Start: 2025-03-11 | End: 1900-01-01

## 2025-03-12 LAB
ALBUMIN SERPL ELPH-MCNC: 4.5 G/DL
ALP BLD-CCNC: 98 U/L
ALT SERPL-CCNC: 14 U/L
ANION GAP SERPL CALC-SCNC: 11 MMOL/L
APPEARANCE: CLEAR
APTT BLD: 30.4 SEC
AST SERPL-CCNC: 22 U/L
BASOPHILS # BLD AUTO: 0.05 K/UL
BASOPHILS NFR BLD AUTO: 0.8 %
BILIRUB SERPL-MCNC: 0.2 MG/DL
BILIRUBIN URINE: NEGATIVE
BLOOD URINE: NEGATIVE
BUN SERPL-MCNC: 14 MG/DL
CALCIUM SERPL-MCNC: 9.9 MG/DL
CHLORIDE SERPL-SCNC: 105 MMOL/L
CO2 SERPL-SCNC: 26 MMOL/L
COLOR: YELLOW
CREAT SERPL-MCNC: 1.33 MG/DL
EGFRCR SERPLBLD CKD-EPI 2021: 75 ML/MIN/1.73M2
EOSINOPHIL # BLD AUTO: 0.16 K/UL
EOSINOPHIL NFR BLD AUTO: 2.6 %
ESTIMATED AVERAGE GLUCOSE: 137 MG/DL
GLUCOSE QUALITATIVE U: NEGATIVE MG/DL
GLUCOSE SERPL-MCNC: 109 MG/DL
HBA1C MFR BLD HPLC: 6.4 %
HCT VFR BLD CALC: 44.7 %
HGB BLD-MCNC: 13.7 G/DL
IMM GRANULOCYTES NFR BLD AUTO: 0.3 %
INR PPP: 0.98 RATIO
KETONES URINE: NEGATIVE MG/DL
LEUKOCYTE ESTERASE URINE: NEGATIVE
LYMPHOCYTES # BLD AUTO: 1.77 K/UL
LYMPHOCYTES NFR BLD AUTO: 29.1 %
MAN DIFF?: NORMAL
MCHC RBC-ENTMCNC: 26.2 PG
MCHC RBC-ENTMCNC: 30.6 G/DL
MCV RBC AUTO: 85.5 FL
MONOCYTES # BLD AUTO: 0.62 K/UL
MONOCYTES NFR BLD AUTO: 10.2 %
NEUTROPHILS # BLD AUTO: 3.47 K/UL
NEUTROPHILS NFR BLD AUTO: 57 %
NITRITE URINE: NEGATIVE
PH URINE: 5.5
PLATELET # BLD AUTO: 291 K/UL
POTASSIUM SERPL-SCNC: 4.3 MMOL/L
PROT SERPL-MCNC: 7.7 G/DL
PROTEIN URINE: 30 MG/DL
PT BLD: 11.6 SEC
RBC # BLD: 5.23 M/UL
RBC # FLD: 15.9 %
SODIUM SERPL-SCNC: 142 MMOL/L
SPECIFIC GRAVITY URINE: >1.03
UROBILINOGEN URINE: 0.2 MG/DL
WBC # FLD AUTO: 6.09 K/UL

## 2025-03-14 ENCOUNTER — APPOINTMENT (OUTPATIENT)
Dept: HEART AND VASCULAR | Facility: CLINIC | Age: 30
End: 2025-03-14
Payer: COMMERCIAL

## 2025-03-14 ENCOUNTER — NON-APPOINTMENT (OUTPATIENT)
Age: 30
End: 2025-03-14

## 2025-03-14 VITALS
DIASTOLIC BLOOD PRESSURE: 101 MMHG | BODY MASS INDEX: 38.11 KG/M2 | TEMPERATURE: 98 F | WEIGHT: 266.19 LBS | HEIGHT: 70 IN | SYSTOLIC BLOOD PRESSURE: 157 MMHG | OXYGEN SATURATION: 99 % | HEART RATE: 89 BPM

## 2025-03-14 PROBLEM — R94.31 INVERTED T WAVE: Status: ACTIVE | Noted: 2024-07-26

## 2025-03-14 PROCEDURE — G2211 COMPLEX E/M VISIT ADD ON: CPT | Mod: NC

## 2025-03-14 PROCEDURE — 93308 TTE F-UP OR LMTD: CPT

## 2025-03-14 PROCEDURE — 93000 ELECTROCARDIOGRAM COMPLETE: CPT

## 2025-03-14 PROCEDURE — 99214 OFFICE O/P EST MOD 30 MIN: CPT

## 2025-03-20 VITALS
RESPIRATION RATE: 18 BRPM | OXYGEN SATURATION: 99 % | HEIGHT: 70 IN | SYSTOLIC BLOOD PRESSURE: 162 MMHG | DIASTOLIC BLOOD PRESSURE: 93 MMHG | HEART RATE: 70 BPM | WEIGHT: 263.89 LBS | TEMPERATURE: 97 F

## 2025-03-20 PROBLEM — Z78.9 OTHER SPECIFIED HEALTH STATUS: Chronic | Status: INACTIVE | Noted: 2024-08-16 | Resolved: 2025-03-20

## 2025-03-20 RX ORDER — AMLODIPINE BESYLATE 10 MG/1
1 TABLET ORAL
Refills: 0 | DISCHARGE

## 2025-03-20 RX ORDER — MOMETASONE FUROATE 50 UG/1
0 SPRAY, METERED NASAL
Refills: 0 | DISCHARGE

## 2025-03-20 RX ORDER — ERGOCALCIFEROL 1.25 MG/1
1 CAPSULE ORAL
Refills: 0 | DISCHARGE

## 2025-03-20 NOTE — ASU PATIENT PROFILE, ADULT - NSICDXPASTMEDICALHX_GEN_ALL_CORE_FT
PAST MEDICAL HISTORY:  Afib     DM (diabetes mellitus)     GERD (gastroesophageal reflux disease)     Hearing loss b/l    HTN (hypertension)     Neoplasm of frontal sinus     ARA (obstructive sleep apnea)     Soft tissue mass left buttock     PAST MEDICAL HISTORY:  Afib once    DM (diabetes mellitus)     GERD (gastroesophageal reflux disease) denies    Hearing loss b/l    HTN (hypertension)     Neoplasm of frontal sinus     ARA (obstructive sleep apnea) c-PAP mashine    Soft tissue mass left buttock

## 2025-03-20 NOTE — ASU PATIENT PROFILE, ADULT - NSICDXPASTSURGICALHX_GEN_ALL_CORE_FT
PAST SURGICAL HISTORY:  S/P myringotomy with insertion of tube      PAST SURGICAL HISTORY:  History of surgery removal of tumor nasal    S/P myringotomy with insertion of tube

## 2025-03-21 ENCOUNTER — TRANSCRIPTION ENCOUNTER (OUTPATIENT)
Age: 30
End: 2025-03-21

## 2025-03-21 ENCOUNTER — OUTPATIENT (OUTPATIENT)
Dept: INPATIENT UNIT | Facility: HOSPITAL | Age: 30
LOS: 1 days | Discharge: HOME | End: 2025-03-21
Payer: COMMERCIAL

## 2025-03-21 ENCOUNTER — APPOINTMENT (OUTPATIENT)
Dept: BARIATRICS | Facility: HOSPITAL | Age: 30
End: 2025-03-21

## 2025-03-21 ENCOUNTER — RESULT REVIEW (OUTPATIENT)
Age: 30
End: 2025-03-21

## 2025-03-21 VITALS
OXYGEN SATURATION: 96 % | HEART RATE: 68 BPM | TEMPERATURE: 97 F | DIASTOLIC BLOOD PRESSURE: 72 MMHG | SYSTOLIC BLOOD PRESSURE: 118 MMHG | RESPIRATION RATE: 18 BRPM

## 2025-03-21 DIAGNOSIS — Z98.890 OTHER SPECIFIED POSTPROCEDURAL STATES: Chronic | ICD-10-CM

## 2025-03-21 DIAGNOSIS — Z96.22 MYRINGOTOMY TUBE(S) STATUS: Chronic | ICD-10-CM

## 2025-03-21 LAB
BLD GP AB SCN SERPL QL: NEGATIVE — SIGNIFICANT CHANGE UP
RH IG SCN BLD-IMP: POSITIVE — SIGNIFICANT CHANGE UP

## 2025-03-21 PROCEDURE — 86850 RBC ANTIBODY SCREEN: CPT

## 2025-03-21 PROCEDURE — 82962 GLUCOSE BLOOD TEST: CPT

## 2025-03-21 PROCEDURE — 86900 BLOOD TYPING SEROLOGIC ABO: CPT

## 2025-03-21 PROCEDURE — 88304 TISSUE EXAM BY PATHOLOGIST: CPT

## 2025-03-21 PROCEDURE — 88304 TISSUE EXAM BY PATHOLOGIST: CPT | Mod: 26

## 2025-03-21 PROCEDURE — 27043 EXC HIP PELVIS LES SC 3 CM/>: CPT | Mod: LT

## 2025-03-21 PROCEDURE — 86901 BLOOD TYPING SEROLOGIC RH(D): CPT

## 2025-03-21 RX ORDER — BUPIVACAINE 13.3 MG/ML
20 INJECTION, SUSPENSION, LIPOSOMAL INFILTRATION ONCE
Refills: 0 | Status: DISCONTINUED | OUTPATIENT
Start: 2025-03-21 | End: 2025-03-21

## 2025-03-21 NOTE — PRE-ANESTHESIA EVALUATION ADULT - NSANTHADDINFOFT_GEN_ALL_CORE
Risks, benefits and alternatives including but not limited to nausea, bleeding, and local trauma/positioning related injury discussed. All questions answered. The patient agrees to proceed.  MAC with possible GA explained with R/B accepted

## 2025-03-21 NOTE — ASU DISCHARGE PLAN (ADULT/PEDIATRIC) - ASU DC SPECIAL INSTRUCTIONSFT
For pain, please alternate Tylenol and Motrin every 4 hours.    General Discharge Instructions:  Please resume all regular home medications unless specifically advised not to take a particular medication. Also, please take any new medications as prescribed.  Please get plenty of rest, continue to ambulate several times per day, and drink adequate amounts of fluids. Avoid lifting weights greater than 5-10 lbs until you follow-up with your surgeon, who will instruct you further regarding activity restrictions.  Avoid driving or operating heavy machinery while taking pain medications.  Please follow-up with your surgeon and Primary Care Provider (PCP) as advised.  Incision Care:  *Please call your doctor or nurse practitioner if you have increased pain, swelling, redness, or drainage from the incision site.  *Avoid swimming and baths until your follow-up appointment.  *You may shower, and wash surgical incisions with a mild soap and warm water. Gently pat the area dry.  *If you have staples, they will be removed at your follow-up appointment.  *If you have steri-strips, they will fall off on their own. Please remove any remaining strips 7-10 days after surgery.

## 2025-03-21 NOTE — DISCHARGE NOTE NURSING/CASE MANAGEMENT/SOCIAL WORK - NSDCPEFALRISK_GEN_ALL_CORE
For information on Fall & Injury Prevention, visit: [email or web address] OR  [email or web address] OR  https://www.cdc.gov/steadi/patient.html

## 2025-03-21 NOTE — ASU DISCHARGE PLAN (ADULT/PEDIATRIC) - CARE PROVIDER_API CALL
Damian Lopes  Surgery  [address], [address]  [place] [address]  Phone: [phone]  Fax: [phone]  Follow Up Time:

## 2025-03-21 NOTE — DISCHARGE NOTE NURSING/CASE MANAGEMENT/SOCIAL WORK - PATIENT PORTAL LINK FT
You can access the FollowMyHealth Patient Portal offered by [facility] by registering at the following website: [email or web address]. By joining [employer]’s FollowMyHealth portal, you will also be able to view your health information using other applications (apps) compatible with our system.

## 2025-03-21 NOTE — ASU DISCHARGE PLAN (ADULT/PEDIATRIC) - NS MD DC FALL RISK RISK
For information on Fall & Injury Prevention, visit: [email or web address] OR  [email or web address] OR  https://www.cdc.gov/steadi/patient.html

## 2025-03-21 NOTE — BRIEF OPERATIVE NOTE - OPERATION/FINDINGS
Lithotomy position. Prepped and draped. Elliptical incision made surrounding L gluteal soft tissue mass. Dissection continued circumferentially to level of subcutaneous tissue. Mass amputated at base. Hemostasis achieved. Defect approximated with 2-0 Vicryl. Skin closed with Vicryl, Monocryl, Dermabond.

## 2025-03-21 NOTE — ASU DISCHARGE PLAN (ADULT/PEDIATRIC) - FINANCIAL ASSISTANCE
[facility] provides services at a reduced cost to those who are determined to be eligible through [facility]’s financial assistance program. Information regarding [facility]’s financial assistance program can be found by going to [email or web address] or by calling [phone].

## 2025-03-21 NOTE — DISCHARGE NOTE NURSING/CASE MANAGEMENT/SOCIAL WORK - FINANCIAL ASSISTANCE
[facility] provides services at a reduced cost to those who are determined to be eligible through [facility]’s financial assistance program. Information regarding [facility]’s financial assistance program can be found by going to [email or web address] or by calling [phone].

## 2025-03-21 NOTE — PRE-ANESTHESIA EVALUATION ADULT - NSANTHPEFT_GEN_ALL_CORE
General: Appearance is consistent with chronological age. No abnormal facies. obese  Cardiovascular:  Rate and rhythm evaluated  Respiratory: Unlabored breathing  Neurological: Awake and alert, moves all extremities

## 2025-04-02 ENCOUNTER — APPOINTMENT (OUTPATIENT)
Dept: BARIATRICS | Facility: CLINIC | Age: 30
End: 2025-04-02

## 2025-04-02 VITALS
HEART RATE: 90 BPM | HEIGHT: 70 IN | SYSTOLIC BLOOD PRESSURE: 136 MMHG | DIASTOLIC BLOOD PRESSURE: 76 MMHG | TEMPERATURE: 98 F | OXYGEN SATURATION: 98 % | BODY MASS INDEX: 37.65 KG/M2 | WEIGHT: 263 LBS

## 2025-04-02 PROCEDURE — 99024 POSTOP FOLLOW-UP VISIT: CPT

## 2025-06-03 ENCOUNTER — APPOINTMENT (OUTPATIENT)
Dept: PULMONOLOGY | Facility: CLINIC | Age: 30
End: 2025-06-03

## 2025-06-27 ENCOUNTER — APPOINTMENT (OUTPATIENT)
Dept: HEART AND VASCULAR | Facility: CLINIC | Age: 30
End: 2025-06-27
Payer: COMMERCIAL

## 2025-06-27 VITALS
SYSTOLIC BLOOD PRESSURE: 149 MMHG | WEIGHT: 265.13 LBS | HEIGHT: 70 IN | BODY MASS INDEX: 37.96 KG/M2 | DIASTOLIC BLOOD PRESSURE: 84 MMHG | TEMPERATURE: 98.8 F | OXYGEN SATURATION: 98 % | HEART RATE: 78 BPM

## 2025-06-27 PROBLEM — I51.7 LEFT VENTRICULAR HYPERTROPHY: Status: ACTIVE | Noted: 2025-06-27

## 2025-06-27 PROCEDURE — 99214 OFFICE O/P EST MOD 30 MIN: CPT

## 2025-06-27 PROCEDURE — G2211 COMPLEX E/M VISIT ADD ON: CPT | Mod: NC

## 2025-06-27 RX ORDER — HYDROCHLOROTHIAZIDE 25 MG/1
25 TABLET ORAL DAILY
Qty: 90 | Refills: 3 | Status: ACTIVE | COMMUNITY
Start: 2025-06-27 | End: 1900-01-01

## 2025-09-05 ENCOUNTER — APPOINTMENT (OUTPATIENT)
Dept: HEART AND VASCULAR | Facility: CLINIC | Age: 30
End: 2025-09-05

## 2025-09-18 ENCOUNTER — RX RENEWAL (OUTPATIENT)
Age: 30
End: 2025-09-18

## (undated) DEVICE — DRAPE TOWEL BLUE 17" X 24"

## (undated) DEVICE — DRSG MASTISOL

## (undated) DEVICE — SUCTION CATH AIRLIFE CONTROL VALVE TRIFLO 14FR

## (undated) DEVICE — STRYKER INSTRUMENT BATTERY

## (undated) DEVICE — CRANIAL MASK TRACKER

## (undated) DEVICE — TAPE SILK 3"

## (undated) DEVICE — PACKING NASAL STD 1.5X4.5X2CM

## (undated) DEVICE — DRAPE MAGNETIC INSTRUMENT MEDIUM

## (undated) DEVICE — SOL ANTI FOG

## (undated) DEVICE — ELCTR HOLSTER ACCESSORY

## (undated) DEVICE — POSITIONER FOAM EGG CRATE ULNAR 2PCS (PINK)

## (undated) DEVICE — BUR MEDTRONIC ENT TAPERED DIAMOND CHOANAL ATRESIA 15 DEGREE 4MM X 13CM

## (undated) DEVICE — GOWN XL

## (undated) DEVICE — Device

## (undated) DEVICE — GLV 8.5 PROTEXIS (WHITE)

## (undated) DEVICE — MARKING PEN W RULER

## (undated) DEVICE — MINI DOPPLER PROBE

## (undated) DEVICE — NEPTUNE II 4-PORT MANIFOLD

## (undated) DEVICE — SYR LUER LOK 50CC

## (undated) DEVICE — STAPLER SKIN PROXIMATE

## (undated) DEVICE — ELCTR BOVIE SUCTION 8FR 6"

## (undated) DEVICE — PREP BETADINE KIT

## (undated) DEVICE — DRAPE LIGHT HANDLE COVER (GREEN)

## (undated) DEVICE — BUR MEDTRONIC ENT REVERSED TAPERED CUTTING 70 DEGREE 4.0MM X 13CM

## (undated) DEVICE — FOLEY TRAY 16FR 5CC LF UMETER CLOSED

## (undated) DEVICE — STAPLER COVIDIEN SKIN APPOSE 35

## (undated) DEVICE — GLV 7 PROTEXIS (WHITE)

## (undated) DEVICE — SYR BULB 2OZ (BLUE)

## (undated) DEVICE — DRSG TEGADERM 1.75X1.75"

## (undated) DEVICE — SUT MONOCRYL 4-0 27" PS-2 UNDYED

## (undated) DEVICE — DRAPE INSTRUMENT POUCH 6.75" X 11"

## (undated) DEVICE — POSITIONER FOAM HEAD DONUT 9" (PINK)

## (undated) DEVICE — BLADE MEDTRONIC ENT RAD 60 DEGREE ROTATABLE 4MM X 11CM

## (undated) DEVICE — DRSG TEGADERM 2.5X3"

## (undated) DEVICE — DRSG STERISTRIPS 0.5 X 4"

## (undated) DEVICE — TUBING SUCTION 20FT

## (undated) DEVICE — NDL ELECTRODE ULTRACLEAN 6

## (undated) DEVICE — SUT PLAIN GUT 4-0 18" SC-1

## (undated) DEVICE — MIDAS REX MR7 LUBRICANT DIFFUSER CARTRIDGE

## (undated) DEVICE — SYR ASEPTO

## (undated) DEVICE — BUR MEDTRONIC ENT HIGH SPEED TAPERED DIAMOND 70 DEGREE 4.0MM X 13CM

## (undated) DEVICE — PACK UPPER BODY

## (undated) DEVICE — MIDAS REX MR8 TELESCOPING MATCH HEAD DIAMOND LG BORE 4.5MM X 12CM

## (undated) DEVICE — DRAPE TOWEL BLUE STICKY

## (undated) DEVICE — NDL SPINAL 18G X 3.5" (PINK)

## (undated) DEVICE — GOWN ROYAL SILK XL

## (undated) DEVICE — SPECIMEN CONTAINER 100ML

## (undated) DEVICE — DRAPE SURGICAL #1010

## (undated) DEVICE — SOL BALANCE SALT 15ML

## (undated) DEVICE — ELCTR STRYKER NEPTUNE SMOKE EVACUATION PENCIL (GREEN)

## (undated) DEVICE — MIDAS REX MR8 TELESCOPING MATCH HEAD DIAMOND LG BORE 3.5MM X 12CM

## (undated) DEVICE — GLV 8 PROTEXIS (WHITE)

## (undated) DEVICE — SUT PROLENE 2-0 30" CT-2

## (undated) DEVICE — SUT CHROMIC 4-0 27" RB-1

## (undated) DEVICE — DRSG MEROCEL STANDARD W STRING 8CM

## (undated) DEVICE — STORZ DURA MICRO KNIFE INSERT SICKLE-SHAPED

## (undated) DEVICE — DOPPLER PROBE 280MM 20MHZ BAYONETED STERILE

## (undated) DEVICE — BLADE MEDTRONIC ENT QUADCUT ROTATABLE STRAIGHT 4MM X 13CM